# Patient Record
Sex: MALE | Race: WHITE
[De-identification: names, ages, dates, MRNs, and addresses within clinical notes are randomized per-mention and may not be internally consistent; named-entity substitution may affect disease eponyms.]

---

## 2019-06-25 ENCOUNTER — HOSPITAL ENCOUNTER (OUTPATIENT)
Dept: HOSPITAL 84 - D.HCCARDIO | Age: 59
Discharge: HOME | End: 2019-06-25
Attending: INTERNAL MEDICINE
Payer: COMMERCIAL

## 2019-06-25 VITALS — BODY MASS INDEX: 21.9 KG/M2

## 2019-06-25 DIAGNOSIS — I35.9: Primary | ICD-10-CM

## 2019-06-26 NOTE — EC
PATIENT:DARRIN KENNEDY             DATE OF SERVICE: 06/25/19
SEX: M                                  MEDICAL RECORD: [id number]
DATE OF BIRTH: [birth year]                        [facility]          
AGE OF PATIENT: 58                             ADMISSION DATE: 06/25/19
 
REFERRING PHYSICIAN:                               
 
INTERPRETING PHYSICIAN: DALJIT OROSCO MD             
 
 
 
                             ECHOCARDIOGRAM REPORT
  ECHO CHARGES 4               ECHO COMPLETE                 Date: 06/25/19
 
 
 
CLINICAL DIAGNOSIS: EDEMA/AVR                     
                    H/O HTN                       
                         ECHOCARDIOGRAPHIC MEASUREMENTS
      (adult normal given)
   AC root (d.<3.7cm) 3.3  cm   LV Septum d (<1.2 cm> 1.4  cm
      Valve Excursion 1.4  cm     LV Septum (systole) 2.0  cm
Left Atria (s.<4.0cm> 4.1  cm          LVPW d(<1.2cm) 1.5  cm
        RV (d.<2.3cm) 2.9  cm           LVPW (sytole) 2.0  cm
  LV diastole(<5.6CM) 6.0  cm       MV E-F(>70mm/sec)      cm
           LV systole 4.0  cm           LVOT Diameter 2.4  cm
       MV exc.(>10mm)      cm
Est.ejection fraction (50-75%)     %
 
   DOPPLER:
     LVIT      cm/sec A 45.0 cm/sec E 132   cm/sec
       LA      cm/sec      RVSP 45.0 mmHg
     LVOT 77.0 cm/sec   AOP1/2T      m/s
  Asc. Ao 357  cm/sec
     RVOT 52.0 cm/sec
       RA      cm/sec
         cm/sec
 AV Gradient Peak 51.0 mmHg  AV Mean 27.0 mmHg  AV Area 1.0  cm
 MV Gradient Peak 7.9  mmHg  MV Mean 2.4  mmHg  MV Area      cm
   COMMENTS: OP - HC                                      
 
 
 Cardiac Sonographer: Hussein WEIR JESSICA            
      Cardiologist: 1          Dr. Orosco                
             TAPE# PACS           
                                       Pericardial Effusion N                        
 
 
DATE OF SERVICE:  06/25/2019
 
FINDINGS:
1. Left ventricular chamber size is mildly dilated.  Left ventricular systolic
function is mild to moderately reduced at 35%.
2. Left atrium, right atrium, and right ventricular chamber sizes are dilated. 
Left atrium measures 4.1 cm.
3. Valvular structures:  Aortic valve was replaced with mechanical prosthesis
with normal structure and function in this position.  The remaining valvular
structures have normal structure and motion.
 
 
 
ECHOCARDIOGRAM REPORT                          [id number]    DARRIN KENNEDY     
 
 
4. Doppler interrogation reveals mild mitral regurgitation, moderate tricuspid
regurgitation, no other valvular insufficiency or stenosis.  Pulmonary systolic
pressure is estimated at 45 mmHg.
5. No evidence of pericardial effusion or left ventricular thrombus.
 
TRANSINT:[id number] Voice Confirmation ID: [id number] DOCUMENT ID: [id number]
                                           
                                           DALJIT OROSCO MD             
 
 
 
Electronically Signed by DALJIT OROSCO on 06/26/19 at 1000
 
 
 
 
 
 
 
 
 
 
 
 
 
 
 
 
 
 
 
 
 
 
 
 
 
 
 
 
 
 
 
 
 
 
CC:                                                             [id number]
DICTATION DATE: 06/25/19 1554     :     06/26/19 0004      [facility] CLI 
                                                                      06/25/19
[address] [address]

## 2019-07-05 ENCOUNTER — HOSPITAL ENCOUNTER (EMERGENCY)
Dept: HOSPITAL 84 - D.ER | Age: 59
Discharge: HOME | End: 2019-07-05
Payer: COMMERCIAL

## 2019-07-05 VITALS — BODY MASS INDEX: 20.7 KG/M2

## 2019-07-05 VITALS — SYSTOLIC BLOOD PRESSURE: 127 MMHG | DIASTOLIC BLOOD PRESSURE: 71 MMHG

## 2019-07-05 DIAGNOSIS — R53.1: ICD-10-CM

## 2019-07-05 DIAGNOSIS — I10: ICD-10-CM

## 2019-07-05 DIAGNOSIS — D64.9: Primary | ICD-10-CM

## 2019-07-05 DIAGNOSIS — R11.2: ICD-10-CM

## 2019-07-05 LAB
ALBUMIN SERPL-MCNC: 3.8 G/DL (ref 3.4–5)
ALP SERPL-CCNC: 78 U/L (ref 46–116)
ALT SERPL-CCNC: 42 U/L (ref 10–68)
ANION GAP SERPL CALC-SCNC: 12.1 MMOL/L (ref 8–16)
APPEARANCE UR: CLEAR
APTT BLD: 40.2 SECONDS (ref 22.8–39.4)
BASOPHILS NFR BLD AUTO: 0.2 % (ref 0–2)
BILIRUB SERPL-MCNC: 0.32 MG/DL (ref 0.2–1.3)
BILIRUB SERPL-MCNC: NEGATIVE MG/DL
BUN SERPL-MCNC: 22 MG/DL (ref 7–18)
CALCIUM SERPL-MCNC: 9.2 MG/DL (ref 8.5–10.1)
CHLORIDE SERPL-SCNC: 101 MMOL/L (ref 98–107)
CK MB SERPL-MCNC: 0.6 U/L (ref 0–3.6)
CK SERPL-CCNC: 50 UL (ref 21–232)
CO2 SERPL-SCNC: 26.9 MMOL/L (ref 21–32)
COLOR UR: YELLOW
CREAT SERPL-MCNC: 1.1 MG/DL (ref 0.6–1.3)
EOSINOPHIL NFR BLD: 2.3 % (ref 0–7)
ERYTHROCYTE [DISTWIDTH] IN BLOOD BY AUTOMATED COUNT: 15 % (ref 11.5–14.5)
GLOBULIN SER-MCNC: 3.9 G/L
GLUCOSE SERPL-MCNC: 134 MG/DL (ref 74–106)
GLUCOSE SERPL-MCNC: NEGATIVE MG/DL
HCT VFR BLD CALC: 36.4 % (ref 42–54)
HGB BLD-MCNC: 11.9 G/DL (ref 13.5–17.5)
IMM GRANULOCYTES NFR BLD: 0.2 % (ref 0–5)
INR PPP: 2.52 (ref 0.85–1.17)
KETONES UR STRIP-MCNC: NEGATIVE MG/DL
LYMPHOCYTES NFR BLD AUTO: 21.1 % (ref 15–50)
MAGNESIUM SERPL-MCNC: 1.9 MG/DL (ref 1.8–2.4)
MCH RBC QN AUTO: 29.2 PG (ref 26–34)
MCHC RBC AUTO-ENTMCNC: 32.7 G/DL (ref 31–37)
MCV RBC: 89.4 FL (ref 80–100)
MONOCYTES NFR BLD: 6.5 % (ref 2–11)
NEUTROPHILS NFR BLD AUTO: 69.7 % (ref 40–80)
NITRITE UR-MCNC: NEGATIVE MG/ML
OSMOLALITY SERPL CALC.SUM OF ELEC: 276 MOSM/KG (ref 275–300)
PH UR STRIP: 5 [PH] (ref 5–6)
PLATELET # BLD: 252 10X3/UL (ref 130–400)
PMV BLD AUTO: 10.2 FL (ref 7.4–10.4)
POTASSIUM SERPL-SCNC: 4 MMOL/L (ref 3.5–5.1)
PROT SERPL-MCNC: 7.7 G/DL (ref 6.4–8.2)
PROT UR-MCNC: (no result) MG/DL
PROTHROMBIN TIME: 26.5 SECONDS (ref 11.6–15)
RBC # BLD AUTO: 4.07 10X6/UL (ref 4.2–6.1)
SODIUM SERPL-SCNC: 136 MMOL/L (ref 136–145)
SP GR UR STRIP: 1.01 (ref 1–1.02)
TROPONIN I SERPL-MCNC: < 0.017 NG/ML (ref 0–0.06)
UROBILINOGEN UR-MCNC: NORMAL MG/DL
WBC # BLD AUTO: 9 10X3/UL (ref 4.8–10.8)

## 2019-07-08 ENCOUNTER — HOSPITAL ENCOUNTER (INPATIENT)
Dept: HOSPITAL 84 - D.M2 | Age: 59
LOS: 3 days | Discharge: HOME | DRG: 812 | End: 2019-07-11
Attending: FAMILY MEDICINE | Admitting: FAMILY MEDICINE
Payer: COMMERCIAL

## 2019-07-08 VITALS
HEIGHT: 76 IN | WEIGHT: 172.36 LBS | BODY MASS INDEX: 20.99 KG/M2 | WEIGHT: 172.36 LBS | BODY MASS INDEX: 20.99 KG/M2 | HEIGHT: 76 IN

## 2019-07-08 VITALS — SYSTOLIC BLOOD PRESSURE: 115 MMHG | DIASTOLIC BLOOD PRESSURE: 63 MMHG

## 2019-07-08 DIAGNOSIS — K21.0: ICD-10-CM

## 2019-07-08 DIAGNOSIS — J44.9: ICD-10-CM

## 2019-07-08 DIAGNOSIS — F17.213: ICD-10-CM

## 2019-07-08 DIAGNOSIS — K25.9: ICD-10-CM

## 2019-07-08 DIAGNOSIS — I11.0: ICD-10-CM

## 2019-07-08 DIAGNOSIS — I25.119: ICD-10-CM

## 2019-07-08 DIAGNOSIS — K29.70: ICD-10-CM

## 2019-07-08 DIAGNOSIS — D50.0: Primary | ICD-10-CM

## 2019-07-08 DIAGNOSIS — I50.20: ICD-10-CM

## 2019-07-08 LAB
ALBUMIN SERPL-MCNC: 3.5 G/DL (ref 3.4–5)
ALP SERPL-CCNC: 66 U/L (ref 46–116)
ALT SERPL-CCNC: 38 U/L (ref 10–68)
ANION GAP SERPL CALC-SCNC: 9.7 MMOL/L (ref 8–16)
BASOPHILS NFR BLD AUTO: 0.4 % (ref 0–2)
BILIRUB SERPL-MCNC: 0.18 MG/DL (ref 0.2–1.3)
BUN SERPL-MCNC: 18 MG/DL (ref 7–18)
CALCIUM SERPL-MCNC: 8.5 MG/DL (ref 8.5–10.1)
CHLORIDE SERPL-SCNC: 104 MMOL/L (ref 98–107)
CK MB SERPL-MCNC: 1.1 U/L (ref 0–3.6)
CK SERPL-CCNC: 53 UL (ref 21–232)
CO2 SERPL-SCNC: 30.5 MMOL/L (ref 21–32)
CREAT SERPL-MCNC: 1.1 MG/DL (ref 0.6–1.3)
EOSINOPHIL NFR BLD: 5.2 % (ref 0–7)
ERYTHROCYTE [DISTWIDTH] IN BLOOD BY AUTOMATED COUNT: 15.6 % (ref 11.5–14.5)
GLOBULIN SER-MCNC: 3.1 G/L
GLUCOSE SERPL-MCNC: 96 MG/DL (ref 74–106)
HCT VFR BLD CALC: 35.1 % (ref 42–54)
HGB BLD-MCNC: 11.2 G/DL (ref 13.5–17.5)
IMM GRANULOCYTES NFR BLD: 0.1 % (ref 0–5)
INR PPP: 2.5 (ref 0.85–1.17)
LYMPHOCYTES NFR BLD AUTO: 29.5 % (ref 15–50)
MAGNESIUM SERPL-MCNC: 1.8 MG/DL (ref 1.8–2.4)
MCH RBC QN AUTO: 29.2 PG (ref 26–34)
MCHC RBC AUTO-ENTMCNC: 31.9 G/DL (ref 31–37)
MCV RBC: 91.6 FL (ref 80–100)
MONOCYTES NFR BLD: 8.4 % (ref 2–11)
NEUTROPHILS NFR BLD AUTO: 56.4 % (ref 40–80)
OSMOLALITY SERPL CALC.SUM OF ELEC: 280 MOSM/KG (ref 275–300)
PHOSPHATE SERPL-MCNC: 3.8 MG/DL (ref 2.5–4.9)
PLATELET # BLD: 239 10X3/UL (ref 130–400)
PMV BLD AUTO: 10.1 FL (ref 7.4–10.4)
POTASSIUM SERPL-SCNC: 4.2 MMOL/L (ref 3.5–5.1)
PROT SERPL-MCNC: 6.6 G/DL (ref 6.4–8.2)
PROTHROMBIN TIME: 26.3 SECONDS (ref 11.6–15)
RBC # BLD AUTO: 3.83 10X6/UL (ref 4.2–6.1)
SODIUM SERPL-SCNC: 140 MMOL/L (ref 136–145)
TROPONIN I SERPL-MCNC: < 0.017 NG/ML (ref 0–0.06)
WBC # BLD AUTO: 7 10X3/UL (ref 4.8–10.8)

## 2019-07-09 VITALS — DIASTOLIC BLOOD PRESSURE: 73 MMHG | SYSTOLIC BLOOD PRESSURE: 129 MMHG

## 2019-07-09 VITALS — DIASTOLIC BLOOD PRESSURE: 65 MMHG | SYSTOLIC BLOOD PRESSURE: 149 MMHG

## 2019-07-09 VITALS — DIASTOLIC BLOOD PRESSURE: 58 MMHG | SYSTOLIC BLOOD PRESSURE: 119 MMHG

## 2019-07-09 VITALS — DIASTOLIC BLOOD PRESSURE: 63 MMHG | SYSTOLIC BLOOD PRESSURE: 115 MMHG

## 2019-07-09 VITALS — DIASTOLIC BLOOD PRESSURE: 70 MMHG | SYSTOLIC BLOOD PRESSURE: 136 MMHG

## 2019-07-09 VITALS — DIASTOLIC BLOOD PRESSURE: 55 MMHG | SYSTOLIC BLOOD PRESSURE: 157 MMHG

## 2019-07-09 LAB
ALBUMIN SERPL-MCNC: 3.3 G/DL (ref 3.4–5)
ALP SERPL-CCNC: 64 U/L (ref 46–116)
ALT SERPL-CCNC: 32 U/L (ref 10–68)
ANION GAP SERPL CALC-SCNC: 12.1 MMOL/L (ref 8–16)
APTT BLD: 35 SECONDS (ref 22.8–39.4)
BASOPHILS NFR BLD AUTO: 0.2 % (ref 0–2)
BILIRUB SERPL-MCNC: 0.28 MG/DL (ref 0.2–1.3)
BUN SERPL-MCNC: 19 MG/DL (ref 7–18)
CALCIUM SERPL-MCNC: 7.9 MG/DL (ref 8.5–10.1)
CHLORIDE SERPL-SCNC: 101 MMOL/L (ref 98–107)
CK MB SERPL-MCNC: 0.9 U/L (ref 0–3.6)
CK MB SERPL-MCNC: 1.1 U/L (ref 0–3.6)
CK MB SERPL-MCNC: 1.2 U/L (ref 0–3.6)
CK SERPL-CCNC: 50 UL (ref 21–232)
CK SERPL-CCNC: 53 UL (ref 21–232)
CK SERPL-CCNC: 57 UL (ref 21–232)
CO2 SERPL-SCNC: 27.3 MMOL/L (ref 21–32)
CREAT SERPL-MCNC: 1.2 MG/DL (ref 0.6–1.3)
EOSINOPHIL NFR BLD: 3.3 % (ref 0–7)
ERYTHROCYTE [DISTWIDTH] IN BLOOD BY AUTOMATED COUNT: 15.8 % (ref 11.5–14.5)
FERRITIN SERPL-MCNC: 134 NG/ML (ref 3–244)
GLOBULIN SER-MCNC: 3.1 G/L
GLUCOSE SERPL-MCNC: 159 MG/DL (ref 74–106)
HCT VFR BLD CALC: 34.3 % (ref 42–54)
HGB BLD-MCNC: 10.7 G/DL (ref 13.5–17.5)
IMM GRANULOCYTES NFR BLD: 0.2 % (ref 0–5)
INR PPP: 2.47 (ref 0.85–1.17)
IRON SERPL-MCNC: 77 UG/DL (ref 35–150)
LDH1 SERPL-CCNC: 362 U/L (ref 85–227)
LYMPHOCYTES NFR BLD AUTO: 14.5 % (ref 15–50)
MCH RBC QN AUTO: 28.6 PG (ref 26–34)
MCHC RBC AUTO-ENTMCNC: 31.2 G/DL (ref 31–37)
MCV RBC: 91.7 FL (ref 80–100)
MONOCYTES NFR BLD: 6.6 % (ref 2–11)
NEUTROPHILS NFR BLD AUTO: 75.2 % (ref 40–80)
NT-PROBNP SERPL-MCNC: 422 PG/ML (ref 0–125)
OSMOLALITY SERPL CALC.SUM OF ELEC: 276 MOSM/KG (ref 275–300)
PLATELET # BLD: 203 10X3/UL (ref 130–400)
PMV BLD AUTO: 10.1 FL (ref 7.4–10.4)
POTASSIUM SERPL-SCNC: 4.4 MMOL/L (ref 3.5–5.1)
PROT SERPL-MCNC: 6.4 G/DL (ref 6.4–8.2)
PROTHROMBIN TIME: 26 SECONDS (ref 11.6–15)
RBC # BLD AUTO: 3.74 10X6/UL (ref 4.2–6.1)
SAO2 % BLD FROM PO2: 22 % (ref 15–55)
SODIUM SERPL-SCNC: 136 MMOL/L (ref 136–145)
TIBC SERPL-MCNC: 345 UG/DL (ref 260–445)
TROPONIN I SERPL-MCNC: < 0.017 NG/ML (ref 0–0.06)
UIBC SERPL-MCNC: 268 UG/DL (ref 150–375)
WBC # BLD AUTO: 10.2 10X3/UL (ref 4.8–10.8)

## 2019-07-09 NOTE — NUR
ROUNDS COMPLETED. VSS, AAOX3, NO S/S OF RR DISTRESS. RR EVEN AND UNLABORED. PT
COMPLAIN OF BACK AND ABD PAIN. WILL ADMINISTER PAIN MED. PT DENIES ANY FURTHER
NEEDS AT THIS TIME. WILL CTM.

## 2019-07-10 VITALS — SYSTOLIC BLOOD PRESSURE: 132 MMHG | DIASTOLIC BLOOD PRESSURE: 70 MMHG

## 2019-07-10 VITALS — SYSTOLIC BLOOD PRESSURE: 117 MMHG | DIASTOLIC BLOOD PRESSURE: 58 MMHG

## 2019-07-10 VITALS — DIASTOLIC BLOOD PRESSURE: 68 MMHG | SYSTOLIC BLOOD PRESSURE: 116 MMHG

## 2019-07-10 VITALS — DIASTOLIC BLOOD PRESSURE: 57 MMHG | SYSTOLIC BLOOD PRESSURE: 119 MMHG

## 2019-07-10 VITALS — DIASTOLIC BLOOD PRESSURE: 47 MMHG | SYSTOLIC BLOOD PRESSURE: 103 MMHG

## 2019-07-10 VITALS — SYSTOLIC BLOOD PRESSURE: 124 MMHG | DIASTOLIC BLOOD PRESSURE: 70 MMHG

## 2019-07-10 LAB
ANION GAP SERPL CALC-SCNC: 8.2 MMOL/L (ref 8–16)
BASOPHILS NFR BLD AUTO: 0.2 % (ref 0–2)
BUN SERPL-MCNC: 19 MG/DL (ref 7–18)
CALCIUM SERPL-MCNC: 8.4 MG/DL (ref 8.5–10.1)
CHLORIDE SERPL-SCNC: 103 MMOL/L (ref 98–107)
CO2 SERPL-SCNC: 30.3 MMOL/L (ref 21–32)
CREAT SERPL-MCNC: 0.9 MG/DL (ref 0.6–1.3)
EOSINOPHIL NFR BLD: 3.1 % (ref 0–7)
ERYTHROCYTE [DISTWIDTH] IN BLOOD BY AUTOMATED COUNT: 16 % (ref 11.5–14.5)
GLUCOSE SERPL-MCNC: 98 MG/DL (ref 74–106)
HCT VFR BLD CALC: 37 % (ref 42–54)
HGB BLD-MCNC: 11.6 G/DL (ref 13.5–17.5)
IMM GRANULOCYTES NFR BLD: 0.2 % (ref 0–5)
LYMPHOCYTES NFR BLD AUTO: 17.6 % (ref 15–50)
MAGNESIUM SERPL-MCNC: 1.9 MG/DL (ref 1.8–2.4)
MCH RBC QN AUTO: 28.9 PG (ref 26–34)
MCHC RBC AUTO-ENTMCNC: 31.4 G/DL (ref 31–37)
MCV RBC: 92.3 FL (ref 80–100)
MONOCYTES NFR BLD: 8.2 % (ref 2–11)
NEUTROPHILS NFR BLD AUTO: 70.7 % (ref 40–80)
OSMOLALITY SERPL CALC.SUM OF ELEC: 275 MOSM/KG (ref 275–300)
PHOSPHATE SERPL-MCNC: 2.9 MG/DL (ref 2.5–4.9)
PLATELET # BLD: 240 10X3/UL (ref 130–400)
PMV BLD AUTO: 10.2 FL (ref 7.4–10.4)
POTASSIUM SERPL-SCNC: 4.5 MMOL/L (ref 3.5–5.1)
RBC # BLD AUTO: 4.01 10X6/UL (ref 4.2–6.1)
SODIUM SERPL-SCNC: 137 MMOL/L (ref 136–145)
WBC # BLD AUTO: 10.9 10X3/UL (ref 4.8–10.8)

## 2019-07-10 PROCEDURE — 0DJ08ZZ INSPECTION OF UPPER INTESTINAL TRACT, VIA NATURAL OR ARTIFICIAL OPENING ENDOSCOPIC: ICD-10-PCS | Performed by: INTERNAL MEDICINE

## 2019-07-10 NOTE — NUR
ROUNDS COMPLETED. VSS, AAOX3, NO S/S OF RESP DISTRESS. PT AWAITING DC.
OUTGOING NURSE STATES SHE TRIED TO REACH GI DOCTOR TO GET RELEASE INFORMATION
ABOUT PT ANTICOAGULANT THERAPY AT HOME. GI DOCTOR PAGED, NOT RESPONSE AT THIS
TIME. SNACK PROVIDED PER PT REQUEST. PT VOICED THANKS. SPOUSE @BEDSIDE. IV NOT
INFUSING AT THIS TIME. WILL CPOC.

## 2019-07-10 NOTE — NUR
RETURN TO ROOM. ALERT DENIES ANY CURRENT NEEDS. VSS.98.0,116/68,97%,73, AND
18. RESP EVEN WITHOUT LABOR. NO N/V.

## 2019-07-10 NOTE — NUR
REPORT RECEIVED. ALERT ABLE TO VOICE NEEDS RESP EVEN WITHOUT LABOR NO C/O.LEFT
HAND WITH IV INFUSING NS AT 100CC/HR AND PROTONIX DRIP AT 10

## 2019-07-10 NOTE — NUR
LEFT AT THIS TIME TO GO FOR EGD. ALERT AND WITH NO C/O. RESP EVEN WITHOUT
LABOR. MONITOR TECH REPORTS RUN OF 4 VTACH ON MONITOR BUT HE HAS NO SYMPTOMS.

## 2019-07-11 VITALS — DIASTOLIC BLOOD PRESSURE: 72 MMHG | SYSTOLIC BLOOD PRESSURE: 123 MMHG

## 2019-07-11 VITALS — DIASTOLIC BLOOD PRESSURE: 52 MMHG | SYSTOLIC BLOOD PRESSURE: 100 MMHG

## 2019-07-11 LAB
ANION GAP SERPL CALC-SCNC: 5.8 MMOL/L (ref 8–16)
BASOPHILS NFR BLD AUTO: 0.2 % (ref 0–2)
BUN SERPL-MCNC: 21 MG/DL (ref 7–18)
CALCIUM SERPL-MCNC: 8.1 MG/DL (ref 8.5–10.1)
CHLORIDE SERPL-SCNC: 101 MMOL/L (ref 98–107)
CO2 SERPL-SCNC: 33.3 MMOL/L (ref 21–32)
CREAT SERPL-MCNC: 1.1 MG/DL (ref 0.6–1.3)
EOSINOPHIL NFR BLD: 5.3 % (ref 0–7)
ERYTHROCYTE [DISTWIDTH] IN BLOOD BY AUTOMATED COUNT: 16 % (ref 11.5–14.5)
GLUCOSE SERPL-MCNC: 93 MG/DL (ref 74–106)
HCT VFR BLD CALC: 33.5 % (ref 42–54)
HGB BLD-MCNC: 10.7 G/DL (ref 13.5–17.5)
IMM GRANULOCYTES NFR BLD: 0.2 % (ref 0–5)
LYMPHOCYTES NFR BLD AUTO: 30.9 % (ref 15–50)
MAGNESIUM SERPL-MCNC: 1.9 MG/DL (ref 1.8–2.4)
MCH RBC QN AUTO: 29.3 PG (ref 26–34)
MCHC RBC AUTO-ENTMCNC: 31.9 G/DL (ref 31–37)
MCV RBC: 91.8 FL (ref 80–100)
MONOCYTES NFR BLD: 13.7 % (ref 2–11)
NEUTROPHILS NFR BLD AUTO: 49.7 % (ref 40–80)
OSMOLALITY SERPL CALC.SUM OF ELEC: 274 MOSM/KG (ref 275–300)
PHOSPHATE SERPL-MCNC: 4.1 MG/DL (ref 2.5–4.9)
PLATELET # BLD: 218 10X3/UL (ref 130–400)
PMV BLD AUTO: 10.5 FL (ref 7.4–10.4)
POTASSIUM SERPL-SCNC: 4.1 MMOL/L (ref 3.5–5.1)
RBC # BLD AUTO: 3.65 10X6/UL (ref 4.2–6.1)
SODIUM SERPL-SCNC: 136 MMOL/L (ref 136–145)
WBC # BLD AUTO: 6.6 10X3/UL (ref 4.8–10.8)

## 2019-07-11 NOTE — NUR
SPOKE WITH DR AMBROCIO CONCERNING STARTING OF PLAVIX. HE STATED WAIT 5 DAYS THEN
RESTART BUT WHEN I SPOKE WITH PATIENT ABOUT RESTARTING HIS COUMADIN TODAY HE
TOLD ME THEY HAD STOPPED THE PLAVIX AND THAT HE NO LONGER TAKES IT.

## 2019-07-11 NOTE — NUR
SPOKE WITH DR AMBROCIO CONCERNING WHEN TO RESTART COUMADIN AND HE STATED IT COULD
BE RESTARTED TODAY. PAGED DARON TO NOTIFY HER.

## 2019-07-11 NOTE — MORECARE
CASE MANAGEMENT DISCHARGE SUMMARY
 
 
PATIENT: DARRIN KENNEDY                 UNIT: [id number]
ACCOUNT#: [id number]                       ADM DATE: 19
AGE: 58     : 60  SEX: M            ROOM/BED: D.3599    
AUTHOR: MARGARITA,DOC                             PHYSICIAN:                               
 
REFERRING PHYSICIAN: DELGADO CISNEROS MD                
DATE OF SERVICE: 19
Discharge Plan
 
 
Patient Name: DARRIN KENNEDY
Facility: [facility]:[place]
Encounter #: [id number]
Medical Record #: [id number]
: 1960
Planned Disposition: Home
Anticipated Discharge Date: 19
 
Discharge Date: 2019
Expected LOS: 3
Initial Reviewer: [id number]
Initial Review Date: 2019
Generated: 19   2:35 pm 
Comments
 
DCP- Discharge Planning
 
Updated by [email or web address]: Raymon Bernal on 19  12:32 pm CT
Patient Name: DARRIN KENNEDY                                     
Admission Status: Elective   
Accout number: [id number]                              
Admission Date: 2019   
: 1960                                                        
Admission Diagnosis:ANEMIA, UNSPECIFIED   
Attending: DELGADO CISNEROS                                                
Current LOS:  3   
  
Anticipated DC Date: 2019   
Planned Disposition: Home   
Primary Insurance: [employer] MA[employer]   
  
  
Discharge Planning Comments:   
CM MET WITH PT IN ROOM TO DISCUSS DISCHARGE PLANNING AND NEEDS. PT REPORTS 
LIVING AT HOME INDEPENDENTLY WITH SPOUSE.  PT HAS NO MEDICAL EQUIPMENT AND NO 
OUTSIDE SERVICES ASSISTING IN THE HOME. PT PREFERS [facility] IF 
HE EVER NEEDED EQUIOPMENT.  CM DISCUSSED AVAILABILITY OF HOME HEALTH, REHAB 
 
SERVICES AND MEDICAL EQUIPMENT. PT DENIES DISCHARGE NEEDS, REPORTS HIS WIFE 
WILL PICK HIM UP FOR DISCHARGE HOME.  
  
  
: Raymon Bernal
 DCPIA - Discharge Planning Initial Assessment
 
Updated by [id number]: Raymon Bernal on 19   1:24 pm
*  Is the patient Alert and Oriented?
Yes
*  How many steps to enter\exit or inside your home?
 
*  PCP
DR. MOHAN
*  Pharmacy
[facility]
*  Preadmission Environment
Home with Family
*  ADLs
Independent
*  Equipment
None
*  Other Equipment
[facility], MEDICAL EQUIPMENT PROVIDER PREFERENCE
*  List name and contact numbers for known caregivers / representatives who 
currently or will assist patient after discharge:
DREW KENNEDY, SPOUSE, [phone]
*  Verbal permission to speak to the caregivers and representatives has been 
obtained from the patient.
Yes
*  Community resources currently utilized
None
*  Please name any agencies selected above.
NONE
*  Additional services required to return to the preadmission environment?
No
*  Can the patient safely return to the preadmission environment?
Yes
*  Has this patient been hospitalized within the prior 30 days at any 
hospital?
No
 
 
 
 
 
 
 
Last DP export: 19  12:28 p
Patient Name: DARRIN KENNEDY
 
Encounter #: [id number]
Page [id number]
 
 
 
 
 
Electronically Signed by GISSELL AVILA on 19 at 1335
 
 
 
 
 
 
**All edits/amendments must be made on the electronic document**
 
DICTATION DATE: 19 1335     : KATRINA  19 1335     
RPT#: [id number]                                DC DATE:19
                                               STATUS: DIS IN  
[facility]
[address] [place] [address]
***END OF REPORT***

## 2019-07-11 NOTE — MORECARE
CASE MANAGEMENT DISCHARGE SUMMARY
 
 
PATIENT: DARRIN KENNEDY                 UNIT: [id number]
ACCOUNT#: [id number]                       ADM DATE: 19
AGE: 58     : 60  SEX: M            ROOM/BED: D.2135    
AUTHOR: GISSELL AVILA                             PHYSICIAN:                               
 
REFERRING PHYSICIAN: DELGADO CISNEROS MD                
DATE OF SERVICE: 19
Discharge Plan
 
 
Patient Name: DARRIN KENNEDY
[facility] [place]
Encounter #: [id number]
Medical Record #: [id number]
: 1960
Planned Disposition: Home
Anticipated Discharge Date: 19
 
Discharge Date: 2019
Expected LOS: 3
Initial Reviewer: [email or web address]
Initial Review Date: 2019
Generated: 19   2:28 pm 
 DCPIA - Discharge Planning Initial Assessment
 
Updated by [id number]: Raymon Bernal on 19   1:24 pm
*  Is the patient Alert and Oriented?
Yes
*  How many steps to enter\exit or inside your home?
 
*  PCP
DR. MOHAN
*  Pharmacy
[facility]
*  Preadmission Environment
Home with Family
*  ADLs
Independent
*  Equipment
None
*  Other Equipment
[facility] MEDICAL EQUIPMENT PROVIDER [facility]REFER[facility]
*  List name and contact numbers for known caregivers / representatives who 
currently or will assist patient after discharge:
DREW KENNEDY, SPOUSE, [phone]
*  Verbal permission to speak to the caregivers and representatives has been 
obtained from the patient.
 
Yes
*  Community resources currently utilized
None
*  Please name any agencies selected above.
NONE
*  Additional services required to return to the preadmission environment?
No
*  Can the patient safely return to the preadmission environment?
Yes
*  Has this patient been hospitalized within the prior 30 days at any 
hospital?
No
 
 
 
 
 
 
Patient Name: DARRIN KENNEDY
Encounter #: [id number]
Page [id number]
 
 
 
 
 
Electronically Signed by GISSELL AVILA on 19 at 1328
 
 
 
 
 
 
**All edits/amendments must be made on the electronic document**
 
DICTATION DATE: 19 1328     : KATRINA  19 1328     
RPT#: [id number]                                DC DATE:19
                                               STATUS: DIS IN  
[facility]
[address] [place] [address]
***END OF REPORT***

## 2019-07-11 NOTE — NUR
REPORT RECEIVED. ALERT SITTING IN BED WATCHING TV. SALINE LOCK INTACT. RESP
EVEN WITHOUT LABOR. CL IN REACH. REPORTS NO ABDOMINAL PAIN WITH SOFT ABDOMEN
AND POSITIVE BOWEL SOUNDS

## 2019-07-11 NOTE — NUR
DISCHARGE INSTRUCTIONS EXPLAINED TO HIM IN DETAIL. COPY OF DISCHARGE PAPERS
WITH TEACHING INCLUDED. D/C SALINE LOCK WITH CATHETER INTACT, MINIMAL BLEEDING
FROM SITE. DENIES ANY ABDOMINAL PAIN OR ISSUES. VITALS ARE STABLE. WIFE IF
HERE. HE WAS TOOK DOWN BY W/C.

## 2019-07-15 ENCOUNTER — HOSPITAL ENCOUNTER (INPATIENT)
Dept: HOSPITAL 84 - D.ER | Age: 59
LOS: 3 days | Discharge: HOME | DRG: 246 | End: 2019-07-18
Attending: INTERNAL MEDICINE | Admitting: INTERNAL MEDICINE
Payer: COMMERCIAL

## 2019-07-15 VITALS
HEIGHT: 76 IN | BODY MASS INDEX: 21.11 KG/M2 | WEIGHT: 173.36 LBS | HEIGHT: 76 IN | WEIGHT: 173.36 LBS | BODY MASS INDEX: 21.11 KG/M2 | BODY MASS INDEX: 21.11 KG/M2

## 2019-07-15 VITALS — DIASTOLIC BLOOD PRESSURE: 69 MMHG | SYSTOLIC BLOOD PRESSURE: 125 MMHG

## 2019-07-15 VITALS — SYSTOLIC BLOOD PRESSURE: 125 MMHG | DIASTOLIC BLOOD PRESSURE: 69 MMHG

## 2019-07-15 VITALS — DIASTOLIC BLOOD PRESSURE: 63 MMHG | SYSTOLIC BLOOD PRESSURE: 132 MMHG

## 2019-07-15 VITALS — SYSTOLIC BLOOD PRESSURE: 138 MMHG | DIASTOLIC BLOOD PRESSURE: 72 MMHG

## 2019-07-15 DIAGNOSIS — N17.9: ICD-10-CM

## 2019-07-15 DIAGNOSIS — R29.810: ICD-10-CM

## 2019-07-15 DIAGNOSIS — I11.0: ICD-10-CM

## 2019-07-15 DIAGNOSIS — I21.4: Primary | ICD-10-CM

## 2019-07-15 DIAGNOSIS — Z79.01: ICD-10-CM

## 2019-07-15 DIAGNOSIS — K21.9: ICD-10-CM

## 2019-07-15 DIAGNOSIS — Z86.73: ICD-10-CM

## 2019-07-15 DIAGNOSIS — I50.20: ICD-10-CM

## 2019-07-15 DIAGNOSIS — R47.01: ICD-10-CM

## 2019-07-15 DIAGNOSIS — D64.9: ICD-10-CM

## 2019-07-15 DIAGNOSIS — I63.10: ICD-10-CM

## 2019-07-15 DIAGNOSIS — F17.213: ICD-10-CM

## 2019-07-15 DIAGNOSIS — E78.5: ICD-10-CM

## 2019-07-15 LAB
ALBUMIN SERPL-MCNC: 3.7 G/DL (ref 3.4–5)
ALP SERPL-CCNC: 69 U/L (ref 46–116)
ALT SERPL-CCNC: 31 U/L (ref 10–68)
ANION GAP SERPL CALC-SCNC: 11 MMOL/L (ref 8–16)
APTT BLD: 26.7 SECONDS (ref 22.8–39.4)
BASOPHILS NFR BLD AUTO: 0.3 % (ref 0–2)
BILIRUB SERPL-MCNC: 0.23 MG/DL (ref 0.2–1.3)
BUN SERPL-MCNC: 17 MG/DL (ref 7–18)
CALCIUM SERPL-MCNC: 8.7 MG/DL (ref 8.5–10.1)
CHLORIDE SERPL-SCNC: 99 MMOL/L (ref 98–107)
CK MB SERPL-MCNC: 0.8 U/L (ref 0–3.6)
CK MB SERPL-MCNC: 1 U/L (ref 0–3.6)
CK MB SERPL-MCNC: 1.1 U/L (ref 0–3.6)
CK SERPL-CCNC: 50 UL (ref 21–232)
CK SERPL-CCNC: 56 UL (ref 21–232)
CK SERPL-CCNC: 75 UL (ref 21–232)
CO2 SERPL-SCNC: 31.4 MMOL/L (ref 21–32)
CREAT SERPL-MCNC: 1.1 MG/DL (ref 0.6–1.3)
EOSINOPHIL NFR BLD: 2.7 % (ref 0–7)
ERYTHROCYTE [DISTWIDTH] IN BLOOD BY AUTOMATED COUNT: 16.9 % (ref 11.5–14.5)
GLOBULIN SER-MCNC: 4 G/L
GLUCOSE SERPL-MCNC: 99 MG/DL (ref 74–106)
HCT VFR BLD CALC: 36.8 % (ref 42–54)
HGB BLD-MCNC: 12.1 G/DL (ref 13.5–17.5)
IMM GRANULOCYTES NFR BLD: 0.2 % (ref 0–5)
INR PPP: 1.51 (ref 0.85–1.17)
LYMPHOCYTES NFR BLD AUTO: 21.1 % (ref 15–50)
MAGNESIUM SERPL-MCNC: 2.1 MG/DL (ref 1.8–2.4)
MCH RBC QN AUTO: 29.7 PG (ref 26–34)
MCHC RBC AUTO-ENTMCNC: 32.9 G/DL (ref 31–37)
MCV RBC: 90.4 FL (ref 80–100)
MONOCYTES NFR BLD: 6.9 % (ref 2–11)
NEUTROPHILS NFR BLD AUTO: 68.8 % (ref 40–80)
OSMOLALITY SERPL CALC.SUM OF ELEC: 275 MOSM/KG (ref 275–300)
PLATELET # BLD: 218 10X3/UL (ref 130–400)
PMV BLD AUTO: 10.8 FL (ref 7.4–10.4)
POTASSIUM SERPL-SCNC: 4.4 MMOL/L (ref 3.5–5.1)
PROT SERPL-MCNC: 7.7 G/DL (ref 6.4–8.2)
PROTHROMBIN TIME: 17.6 SECONDS (ref 11.6–15)
RBC # BLD AUTO: 4.07 10X6/UL (ref 4.2–6.1)
SODIUM SERPL-SCNC: 137 MMOL/L (ref 136–145)
TROPONIN I SERPL-MCNC: 0.08 NG/ML (ref 0–0.06)
TROPONIN I SERPL-MCNC: 0.08 NG/ML (ref 0–0.06)
TROPONIN I SERPL-MCNC: 0.1 NG/ML (ref 0–0.06)
TSH SERPL-ACNC: 6.22 UIU/ML (ref 0.36–3.74)
WBC # BLD AUTO: 9.3 10X3/UL (ref 4.8–10.8)

## 2019-07-15 NOTE — MORECARE
CASE MANAGEMENT DISCHARGE SUMMARY
 
 
PATIENT: DARRIN KENNEDY                 UNIT: [id number]
ACCOUNT#: [id number]                       ADM DATE: 07/15/19
AGE: 58     : 60  SEX: M            ROOM/BED: D.2121    
AUTHOR: GISSELL AVILA                             PHYSICIAN:                               
 
REFERRING PHYSICIAN: MIGEL GARCIA MD               
DATE OF SERVICE: 07/15/19
Discharge Plan
 
 
Patient Name: DARRIN KENNEDY
Facility: [facility]:[place]
Encounter #: [id number]
Medical Record #: [id number]
: 1960
Planned Disposition: Home
Anticipated Discharge Date: 19
 
Discharge Date: 
Expected LOS: 2
Initial Reviewer: [email or web address]
Initial Review Date: 07/15/2019
Generated: 7/15/19   7:50 pm 
  
 
 
 
 
 
 
 
Patient Name: DARRIN KENNEDY
 
Encounter #: [id number]
Page [phone]
 
 
 
 
 
Electronically Signed by GISSELL AVILA on 07/15/19 at 1851
 
 
 
 
 
 
**All edits/amendments must be made on the electronic document**
 
DICTATION DATE: 07/15/19 1850     : KATRINA  07/15/19 1850     
RPT#: [id number]                                DC DATE:        
                                               STATUS: ADM IN  
[facility]
[address] [place] [address]
***END OF REPORT***

## 2019-07-15 NOTE — HEMODYNAMI
PATIENT:DARRIN KENNEDY                              MEDICAL RECORD: [id number]
: 60                                            LOCATION:[address]
ACCT# [id number]                                       ADMISSION DATE: 07/15/19
 
 
 Generatedon:201910:54
Patient name: DARRIN KENNEDY Patient #: [id number] Visit #: [id number] SSN: YOB: 1960 Date
of study: 2019
Page: Of
Hemodynamic Procedure Report
****************************
Patient Data
Patient Demographics
Procedure consent was obtained
First Name: DARRIN          Gender: Male
Last Name: BRENT         : 1960
Middle Initial: RAY         Age: 58 year(s)
Patient #: [id number]       Race: Unknown
Visit #: [id number]
Accession #:
[id number]
Additional ID: [id number]
Contact details
Address: [facility] [address]    Phone: [phone]
ROAD
State: [place]
City: [place]
Zip code: [address]
Past Medical History
Allergies: No known allergies
Admission
Admission Data
Admission Date: 7/15/2019   Admission Time: 18:23
Room #: 2121
Lab Results
Lab Result Date: 2019  Lab Result Time: 0:00
Biochemistry
Name         Units    Result                Min      Max
BUN          mg/dl    19       --(----)*-   7        18
Creatinine   mg/dl    1.1      --(--*-)--   0.6      1.3
CBC
Name         Units    Result                Min      Max
Hemoglobin   g/dl     11.2     *-(----)--   13.5     17.5
Procedure
Procedure Types
Cath Procedure
Diagnostic Procedure
LHC
LH w/Coronaries
FFR/IVUS
FFR Initial
PCI Procedure
Coronary Stent
Coronary Stent Initial
Peripheral Cath Diagnostic Procedure
Cath Lab Peripheral Procedures
Four Vessel Arteriogram
Procedure Description
 
Procedure Date
Procedure Date: 2019
Procedure Start Time: 10:35
Procedure End Time: 10:53
Procedure Staff
Name                            Function
Ivan Orosco MD                Performing Physician
Concha Prabhakar RT                    Scrub
Torrey Elizalde RT                  Monitor
Mariya Adame RN                Nurse
Procedure Data
Cath Procedure
Fluoroscopy
Diagnostic fluoroscopy      Total fluoroscopy Time: 4
time: 4 min                 min
Diagnostic fluoroscopy      Total fluoroscopy dose: 589
dose: 589 mGy               mGy
Contrast Material
Contrast Material Type                       Amount (ml)
Isovue 370                                   95
Entry Location
Entry     Primary  Successful  Side  Size  Upsize Upsize Entry    Closure Succes
sful  Closure
Location                             (Fr)  1 (Fr) 2 (Fr) Remarks  Device        
      Remarks
Femoral                        Right 5 Fr  6 Fr                   Exoseal
artery                                     Short
Estimated blood loss: 10 ml
Diagnostic catheters
Device Type               Used For           End Catheter
Placement
MULTIPACK JL 4.0 5Fr      Procedure
catheter
MULTIPACK 3DRC 5Fr        Procedure
catheter
Procedure Complications
No complications
Procedure Medications
Medication           Administration Route Dosage
0.9% NaCl            I.V.                 100 ml/hr
Oxygen               etCO2 Nasal cannula  2 l/min
Lidocaine 2%         added to field       20
Heparin Flush Bag    added to field       2 bags
(1000units/500ml NS)
Versed               I.V.                 2 mg
Fentanyl             I.V.                 50 mcg
Versed               I.V.                 1 mg
Fentanyl             I.V.                 50 mcg
Heparin Bolus        I.V.                 4000 units
Integrilin (Bolus                         6.8 ml
2mg/ml)
Plavix               P.O.                 600 mg
Hemodynamics
Rest
HGB: 11.2 (g/dl) Heart Rate: 72 (bpm)
Snapshots
Pre Cath      Intra         NCS           Post Cath
Vital Signs
Time     Heart  Resp   SPO2 etCO2   NIBP (mmHg) Rhythm  Pain    Sedation
Rate   (ipm)  (%)  (mmHg)                      Status  Level
 
(bpm)
9:54:58  72     20     97   33      148/88(119) NSR     0 (11)  10(A)
, No
pain
9:58:54  68     16     100  34.5    150/92(117) NSR     0 (11)  10(A)
, No
pain
10:02:55 71     16     100  36.8    137/84(113) NSR     0 (11)  10(A)
, No
pain
10:06:55 72     15     100  37.6    130/80(108) NSR     0 (11)  10(A)
, No
pain
10:10:54 75     13     99   38.3    132/78(104) NSR     0 (11)  10(A)
, No
pain
10:14:50 72     13     99   39      135/83(95)  NSR     0 (11)  10(A)
, No
pain
10:18:50 81     12     99   37.6    129/82(101) NSR     0 (11)  10(A)
, No
pain
10:22:51 80     12     99   37.5    122/77(93)  NSR     0 (11)  10(A)
, No
pain
10:26:51 78     10     99   39.1    115/78(91)  NSR     0 (11)  10(A)
, No
pain
10:30:49 81     12     99   39.8    121/79(93)  NSR     0 (11)  10(A)
, No
pain
10:34:48 82     14     99   33.8    116/76(100) NSR     0 (11)  9(A)
, No
pain
10:38:46 81     12     99   19.5    123/80(99)  NSR     0 (11)  9(A)
, No
pain
10:42:43 82     12     99   29.3    128/82(99)  NSR     0 (11)  9(A)
, No
pain
10:46:43 83     13     99   40.6    114/76(104) NSR     0 (11)  10(A)
, No
pain
10:50:45 80     13     98   24.8    106/72(90)  NSR     0 (11)  9(A)
, No
pain
Medications
Time     Medication       Route   Dose  Verified Delivered Reason          Notes
    Effectiveness
by       by
9:55:32  0.9% NaCl        I.V.    100   Ivan  Mariya     used for
ml/hr Ana Luisa Adame   procedure
RN
9:55:38  Oxygen           etCO2   2     Ivan  Mariya     used for
Nasal   l/min Ana Luisa Adame   procedure
cannula                RN
9:55:43  Lidocaine 2%     added   20ml  Ivan  Ivan   for local
to      vial  Ana Luisa Orosco MD  anesthetic
field
9:55:48  Heparin Flush    added   2     Ivan  Ivan   used for
 
Bag              to      bags  Ana Luisa Orosco MD  procedure
(1000units/500ml field
NS)
10:27:27 Versed           I.V.    2 mg  Ivan  Mariya     for sedation
Ana Luisa Adame
RN
10:27:31 Fentanyl         I.V.    50    Ivan  Mariya     for sedation
mcg   Ana Luisa Adame
RN
10:33:11 Versed           I.V.    1 mg  Ivan  Mariya     for sedation
Ana Luisa Adame
RN
10:33:15 Fentanyl         I.V.    50    Ivan  Mariya     for sedation
mcg   Ana Luisa Adame
RN
10:46:04 Heparin Bolus    I.V.    4000  Ivan  Mariya     for             verif
ied
units Ana Luisa Adame   anticoagulation with Dr. LICHA Orosco
10:46:17 Integrilin               6.8   Ivan  Mariya                     waste
d
(Bolus 2mg/ml)           ml    Ana Luisa Adame                   3.2mL
RN
10:46:38 Plavix           P.O.    600   Ivan Meraz     for
mg    Ana Luisa Adame   antiplatelet
RN        therapy
Procedure Log
Time     Note
9:29:15  Diagnostic Cath Status : Elective
9:33:28  Torrey Elizalde RT(R) sent for patient. Start room use.
9:33:29  Time tracking: Regular hours (M-F 7:00 - 5:00)
9:33:33  Plan of Care:Hemodynamics will remain stable., Cardiac
rhythm will remain stable., Comfort level will be
maintained., Respiratory function will remain
adequate., Patient/ family verbilizes understanding of
procedure., Procedure tolerated without complication.,
Recovers from procedure without complications..
9:39:04  Lab Result : Hemoglobin 11.2 g/dl
9:39:04  Lab Result : Creatinine 1.1 mg/dl
9:39:04  Lab Result : BUN 19 mg/dl
9:49:12  Patient received from Med II to CCL 2 Alert and
oriented. Tansferred to table in Supine position.
9:49:13  Warm blankets applied, and kristina hugger turned on for
patient comfort.
9:49:14  Correct patient and procedure confirmed by team.
9:49:15  Signed procedure consent form obtained from patient.
9:49:16  ECG and BP/O2 sat monitors applied to patient.
9:54:05  Vital chart was started
9:55:32  0.9% NaCl 100 ml/hr I.V. was administered by Mariya Adame RN; used for procedure;
9:55:38  Oxygen 2 l/min etCO2 Nasal cannula was administered by
Mariya Adame RN; used for procedure;
9:55:43  Lidocaine 2% 20ml vial added to field was administered
by Ivan Orosco MD; for local anesthetic;
9:55:48  Heparin Flush Bag (1000units/500ml NS) 2 bags added to
field was administered by Ivan Orosco MD; used for
procedure;
10:03:35 Baseline sample Acquired.
10:05:04 Rhythm: sinus rhythm
10:05:12 H&P Date Dictated: 2019 Within 30 days and on
 
chart..
10:05:14 Pre-procedure instructions explained to patient.
10:05:14 Pre-op teaching completed and patient verbalized
understanding.
10:05:15 Family in patients room.
10:05:17 Patient NPO since Midnight.
10:05:22 Patient allergic to No known allergies
10:05:24 Is the patient allergic to Iodine/contrast media? No.
10:05:25 Is patient on blood thinner?No
10:05:27 Patient diabetic? No.
10:05:29 Previous problem with sedation/anesthesia? No ?
10:05:30 Snore? Yes
10:05:30 Sleep apnea? No
10:05:31 Deviated septum? No
10:05:32 Opens mouth fully? Yes
10:05:38 Sticks out tongue? Yes
10:05:41 Airway obstruction? Yes COPD
10:05:45 Dentures? No ?
10:05:48 Pre procedure: right dorsailis pedis pulse 1+
Palpable, but thready & weak; easily obliterated
10:05:50 Patient pain scale 0/10 ?.
10:06:05 IV patent on arrival in left forearm with 0.9% NaCl at
KVO.
10:06:08 Lab results completed and on chart.
10:07:24 Right groin area was prepped with chlora-prep and
draped in sterile fashion
10:07:26 Alarms reviewed by R. N.
10:07:26 Sharps counted by scrub and verified by R.N.
10:07:30 Use device set Femoral Dx
10:07:31 ACIST Syringe (75365) opened to sterile field.
10:07:31 Bag Decanter (2002S) opened to sterile field.
10:07:32 Medline Cath Pack ([id number]) opened to sterile field.
10:07:34 ACIST Hand Control (75627) opened to sterile field.
10:07:34 ACIST Manifold (92766) opened to sterile field.
10:07:34 DIAGNOSTIC Multipack 5Fr catheter set (RE9948) opened
to sterile field.
10:07:35 Tegaderm 4 x 4 (1626W) opened to sterile field.
10:07:37 EMERALD Guide Wire (734-959) opened to sterile field.
10:07:37 SHEATH 5FR Bagley (WVK804) opened to sterile field.
10:10:04 Zero performed for pressure channel P1
10:26:32 Procedure type changed to Cath procedure, Diagnostic
procedure, LHC, LHC w/Coronaries, FFR/IVUS, FFR
Initial, PCI procedure, Coronary Stent, Coronary Stent
Initial, Peripheral Cath Diagnostic Procedure, Cath
Lab Peripheral Procedures, Four Vessel Arteriogram
10::36 Physician arrived
10::36 --------ALL STOP TIME OUT------
10::37 Final Timeout: patient, procedure, and site verified
with staff and physician. All members of the team are
in agreement.
10::38 Right groin site verified by team.
10::42 Fire Safety Assessment: A--An alcohol-based skin
anteseptic being used preoperatively., C--Open oxygen
or nitrous oxide is being used., D--An ESU, laser, or
fiber-optic light is being used.
10:26:45 Physical assessment completed. ASA score P 2 - A
patient with mild systemic disease as per Ivan Orosco MD.
10:26:52 2) 60-89 Mildly reduced kidney function, and other
findings (as for stage 1) point to kidney disease.
 
10:26:56 Maximum allowable contrast does (3.7 X eGFR X 0.75)202
ml.
10:26:59 Sedation plan: IV Moderate Sedation Medication:Versed,
Fentanyl
10:27:27 Versed 2 mg I.V. was administered by Mariya Adame RN;
for sedation;
10:27:31 Fentanyl 50 mcg I.V. was administered by Mariya Adame
RN; for sedation;
10:33:11 Versed 1 mg I.V. was administered by Mariya Adame RN;
for sedation;
10:33:15 Fentanyl 50 mcg I.V. was administered by Mariya Adame
RN; for sedation;
10:35:05 Procedure started.
10:35:05 Full Disclosure recording started
10:35:07 Local anesthetic to right femoral artery with
Lidocaine 2% by Ivan Orosco MD.**INITIAL ACCESS
ONLY**
10:35:14 A 5 Fr sheath was inserted into the Right Femoral
artery
10:35:26 A MULTIPACK JL 4.0 5Fr catheter was advanced over the
wire and used for Procedure.
10:36:12 LCA angiography performed.
10:37:28 Catheter exchanged over wire.
10:37:32 A MULTIPACK 3DRC 5Fr catheter was advanced over the
wire and used for Procedure.
10:38:02 RCA angiography performed.
10:38:29 INFLATOR Merit YouEyepak (UU3800) opened to sterile
field.
10:38:30 SHEATH 6FR Bagley (AGH752) opened to sterile field.
10:38:35 [place]cano Verrata Plus pressure wire (15199S) opened to
sterile field.
10:38:43 GUIDE 6FR XBLAD 4.0 catheter ([id number]) opened to
sterile field.
10:38:50 Left carotid angiography performed.
10:39:18 Right subclavian angiography performed
10:39:20 Right carotid angiography performed.
10:40:42 Catheter removed.
10:40:47 Sheath upsized to a 6 Fr Short.
10:40:56 6 Fr XBLAD 4 guide catheter was inserted over the wire
10:41:07 FFR/IFR wire advanced.
10:42:41 Wire advanced across lesion.
10:44:11 pLAD lesion measured at 0.93 with IFR
10:44:17 dLAD lesion measured at 0.83 with IFR
10:46:04 Heparin Bolus 4000 units I.V. was administered by
Mariya Adame RN; for anticoagulation; verified with
Dr. Orosco
10:46:17 Integrilin (Bolus 2mg/ml) 6.8 ml was administered by
Mariya Adame RN; ; wasted 3.2mL
10:46:20 Place stent Inflation Number: 1 A ALVAREZ RX 2.25 x 12
stent ([id number]) was prepped and advanced across
the Dist LAD . The stent was deployed at 15 DAVID for
0:10 (min:sec) .
10:46:25 Inflation number: 2 The stent balloon was then
re-inflated across the Dist LAD to 15 DAVID for 0:10
(min:sec) .
10:46:38 Plavix 600 mg P.O. was administered by Mariya Adame
RN; for antiplatelet therapy;
10:46:50 Stent catheter was removed intact over wire.
10:47:20 Wire removed.
10:47:21 Guide catheter removed.
 
10:47:28 EXOSEAL 6Fr () opened to sterile field.
10:47:35 Sheath removed intact; hemostasis achieved with
Exoseal to the Right Femoral artery.
10:47:36 Procedure ended.(Physican Out)
10:49:29 Fluoroscopy time 04.00 minutes.
10:49:33 Flurop Dose total: 589
10:49:33 Fluoroscopy dose: 589 mGy
10:49:40 Contrast amount:Isovue 370 95ml.
10:49:46 Sharps counted by scrub and verified by R.N.
10:49:49 Insertion/operative site no bleeding no hematoma.
10:49:52 Post-op/insertion site Right Femoral artery dressed
using a 4 x 4 and Tegaderm.
10:49:55 Post right femoral artery:stable, soft, clean and dry
10:49:56 Post Procedure Pulses reassessed and unchanged
10:49:57 Post-procedure physical assessment completed. ASA
score P 2 - A patient with mild systemic disease as
per Ivan Orosco MD.
10:49:59 Post procedure rhythm: unchanged.
10:50:02 Estimated blood loss: 10 ml
10:50:03 Post procedure instruction explained to
patient.Patient verbalizes understanding.
10:50:04 Patient needs reinforcement of post procedure
teaching.
10:50:16 Pre PCI Site: Native dLAD has 80% stenosis.
10:51:21 Post PCI Site: Native dLAD has 0% stenosis.
10:53:34 Procedure and supply charges have been captured,
reviewed, submitted and are correct.
10:53:36 Procedure Complication : No complications
10:53:37 Vital chart was stopped
10:53:38 See physician's report for complete and final results.
10:53:40 Report given to PCU.
10:53:42 Patient transfered to PCU with Stretcher.
10:53:44 Procedure ended.
10:53:44 Full Disclosure recording stopped
10:53:48 End room use (Document Last)
Intervention Summary
Intervention Notes
Time     ActionType  Lesion and  Equipment Used Action#  Pressure  Duration
Attributes
10:46:20 Place stent Dist LAD    ALVAREZ RX 2.25 x 1        15        00:10
12 stent
([id number])
10:46:25 Reinflate   Dist LAD    ALVAREZ RX 2.25 x 2        15        00:10
stent                   12 stent
balloon                 ([id number])
Device Usage
Item Name      Manufacture  Quantity  Catalog       Hospital Part       Current 
Minimal Lot# /
Number        Charge   Number     Stock   Stock   Serial#
Code
ACIST Syringe  Acist        1         24777         954569   527913     519541  
20
(18127)        Medical
Systems Inc
Bag Decanter   Microtek     1                  129976   47366      442435  
5
()        Medical Inc.
Medline Cath   Medline      1         [id number]     283403   54040      012316  
5
Pack
 
([id number])
ACIST Hand     Acist        1         37311         418363   985272     975056  
5
Control        Medical
(15507)        Systems Inc
ACIST Manifold Acist        1         75413         331411   262673     971796  
5
(94760)        Medical
Systems Inc
DIAGNOSTIC     Cardinal     1         EB9300        453540   53649      581017  
30
Multipack 5Fr  Health
catheter set
(JP7770)
Tegaderm 4 x 4 3M           1         1626W         673548   965887     861912  
5
(1626W)
EMERALD Guide  Cardinal     1         502-455       936888   143234     189079  
5
Wire (502-455) Health
SHEATH 5FR     Terumo       1         WOB576        862738   221918     598862  
5
Bagley
(RWL316)
MULTIPACK JL   Cardinal     1                                           056112  
5
4.0 5Fr        Health
catheter
MULTIPACK 3DRC Cardinal     1                                           879659  
5
5Fr catheter   Health
INFLATOR Merit Merit        1         PD6265        695318   231039     342953  
15
[employer]    Medical
(IO6274)
SHEATH 6FR     Terumo       1         NUY533        355485   663192     102139  
40
Bagley
(RPR225)
Homer        Homer      1         05193U        535143   383679373  188796  
5
Verrata Plus
pressure wire
(80257O)
GUIDE 6FR      Cardinal     1         [id number]      539515   651544     445724  
3
XBLAD 4.0      Health
catheter
([id number])
ALVAREZ RX 2.25 x Medtronic    1         [id number]  675787   8956230    805655  
5       [id number]
12 stent
([id number])
EXOSEAL 6Fr    Cardinal     1                  885874   763476     701366  
10
()        Health
Signature Audit [place]
Stage           Time        Signature      Unsigned
Intra-Procedure 2019   Torrey Elizalde
10:54:45 AM RT(R)
 
Signatures
Performing Physician :  Signature :
Ivan Orosco MD        ______________________________
Date : ______________ Time :
______________
Monitor : Torrey Elizalde RT Signature :
______________________________
Date : ______________ Time :
______________
Nurse : Mariya Adame RN Signature :
______________________________
Date : ______________ Time :
______________
 
 
 
 
 
 
 
 
 
 
 
 
 
 
 
 
 
 
 
 
 
 
 
 
 
 
 
 
 
 
 
 
 
 
 
 
 
 
 
 
 
 
[address] [place] [address]

## 2019-07-15 NOTE — ST
PATIENT:DARRIN KENNEDY                       MEDICAL RECORD: [id number]
SEX: M                                                   [facility]
ORDER #:                                                 ADMISSION DATE: 07/08/19
AGE OF PATIENT: 58            
 
REFERRING PHYSICIAN:                                                             
 
INTERPRETING PHYSICIAN: DALJIT HUNTLEY MD             

 
 
DATE OF SERVICE:  07/09/2019
 
Nuclear Stress Test
 
INDICATIONS:  Angina, coronary artery disease, and cardiomyopathy.
 
PROCEDURE IN DETAIL:  He was exercised on standard Lexiscan protocol with 33 mCi
of sestamibi injected at peak stress and 13 mCi were used previously for rest
images.
 
FINDINGS:  Gated SPECT reveals dilated cardiomyopathy, ejection fraction 37%
with decreased thickening and brightening throughout the inferior and apical
segments.
 
SPECT Imaging:  Cardiolite was used as myocardial perfusion agent.  There is a
definite fixed perfusion defect inferiorly and apically from a previous
inferoapical myocardial infarction.  There is no evidence of reversible ischemia
in fact this area undergoes reverse redistribution and improves with stress. 
The remaining segments looked homogeneous at rest and stress.
 
OVERALL IMPRESSION:  This is an abnormal nuclear stress test only in that it
shows a previous inferoapical myocardial infarction, no ongoing ischemic burden,
dilated cardiomyopathy, ejection fraction 37%.  Standard medical management on
treatment of the cardiomyopathy.
 
TRANSINT:[id number] Voice Confirmation ID: [id number] DOCUMENT ID: [id number]
 
 
                                           
                                           DALJIT HUNTLEY MD             
 
 
 
Electronically Signed by DALJIT HUNTLEY on 07/15/19 at 1838
 
 
 
 
 
CC:                                                             [id number]
DICTATION DATE: 07/09/19 1620     :     07/10/19 0227      DIS IN  
                                                                      07/11/19
[address] [place] [address]
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
## 2019-07-15 NOTE — MORECARE
CASE MANAGEMENT DISCHARGE SUMMARY
 
 
PATIENT: DARRIN KENNEDY                 UNIT: [id number]
ACCOUNT#: [id number]                       ADM DATE: 07/15/19
AGE: 58     : 60  SEX: M            ROOM/BED: D.2121    
AUTHOR: MARGARITA,DOC                             PHYSICIAN:                               
 
REFERRING PHYSICIAN: MIGEL GARCIA MD               
DATE OF SERVICE: 07/15/19
Discharge Plan
 
 
Patient Name: DARRIN KENNEDY
Facility: [facility]
Encounter #: [id number]
Medical Record #: [id number]
: 1960
Planned Disposition: Home
Anticipated Discharge Date: 19
 
Discharge Date: 
Expected LOS: 2
Initial Reviewer: [id number]
Initial Review Date: 07/15/2019
Generated: 7/15/19   8:06 pm 
DCP- Discharge Planning
 
Updated by [email or web address]: Nica Deutsch on 7/15/19   6:04 pm CT
Patient Name: DARRIN KENNEDY                                    
Admission Status: ER  
Accout number: [id number]                             
Admission Date: 07-  
: 1960                                                       
Admission Diagnosis:  
Attending: MIGEL GARCIA                                               
Current LOS:  1  
 
Anticipated DC Date: 2019  
Planned Disposition: Home  
Primary Insurance: [employer]  
 
 
Discharge Planning Comments:  
 
CM met with patient and his wife to complete initial dc planning assessment.  
CM educated patient on the CM role and verbal consent given by patient to 
complete assessment.  CM verified patient's address, phone number, and 
emergency contact phone numbers.  Patient lives at home with his wife and has 
been independent in his care.  At discharge patient plans to return home and 
 
feels this is a safe discharge.  CM discussed availability of home health, 
rehab services, and medical equipment. Patient denied known discharge needs 
at this time. His wife reports she  will transport him home at time of 
discharge. CM will continue to follow and will assist as needed with dc 
plans/needs.   
 
: Nica Deutsch RN, [facility]
 DCPIA - Discharge Planning Initial Assessment
 
Updated by [id number]: Nica Deutsch on 7/15/19   6:53 pm
*  Is the patient Alert and Oriented?
Yes
*  How many steps to enter\exit or inside your home?
 
*  PCP
Dr. Myers
*  Pharmacy
[facility]
*  Preadmission Environment
Home with Family
*  ADLs
Independent
*  Equipment
None
*  List name and contact numbers for known caregivers / representatives who 
currently or will assist patient after discharge:
Anabell Kennedy - wife - [phone]
*  Verbal permission to speak to the caregivers and representatives has been 
obtained from the patient.
Yes
*  Community resources currently utilized
None
*  Additional services required to return to the preadmission environment?
No
*  Can the patient safely return to the preadmission environment?
Yes
*  Has this patient been hospitalized within the prior 30 days at any 
hospital?
Yes
 
 
 
 
 
 
 
Last DP export: 7/15/19   5:58 p
Patient Name: DARRIN KENNEDY
 
Encounter #: [id number]
Page [id number]
 
 
 
 
 
Electronically Signed by GISSELL AVILA on 07/15/19 at 1906
 
 
 
 
 
 
**All edits/amendments must be made on the electronic document**
 
DICTATION DATE: 07/15/19 1905     : KATRINA  07/15/19 1905     
RPT#: [id number]                                DC DATE:        
                                               STATUS: ADM IN  
[facility]
 [place] [address]
***END OF REPORT***

## 2019-07-15 NOTE — MORECARE
CASE MANAGEMENT DISCHARGE SUMMARY
 
 
PATIENT: DARRIN KENNEDY                 UNIT: [id number]
ACCOUNT#: [id number]                       ADM DATE: 07/15/19
AGE: 58     : 60  SEX: M            ROOM/BED: D.2121    
AUTHOR: GISSELL AVILA                             PHYSICIAN:                               
 
REFERRING PHYSICIAN: MIGEL GARCIA MD               
DATE OF SERVICE: 07/15/19
Discharge Plan
 
 
Patient Name: DARRIN KENNEDY
[facility]
Encounter #: [id number]
Medical Record #: [id number]
: 1960
Planned Disposition: Home
Anticipated Discharge Date: 19
 
Discharge Date: 
Expected LOS: 2
Initial Reviewer: [id number]
Initial Review Date: 07/15/2019
Generated: 7/15/19   7:58 pm 
 DCPIA - Discharge Planning Initial Assessment
 
Updated by [id number]: Nica Deutsch on 7/15/19   6:53 pm
*  Is the patient Alert and Oriented?
Yes
*  How many steps to enter\exit or inside your home?
 
*  PCP
Dr. Myers
*  Pharmacy
[facility]
*  Preadmission Environment
Home with Family
*  ADLs
Independent
*  Equipment
None
*  List name and contact numbers for known caregivers / representatives who 
currently or will assist patient after discharge:
Anabell Kennedy - wife - [phone]
*  Verbal permission to speak to the caregivers and representatives has been 
obtained from the patient.
Yes
*  Community resources currently utilized
 
None
*  Additional services required to return to the preadmission environment?
No
*  Can the patient safely return to the preadmission environment?
Yes
*  Has this patient been hospitalized within the prior 30 days at any 
hospital?
Yes
 
 
 
 
 
 
 
Last DP export: 7/15/19   5:51 p
Patient Name: DARRIN KENNEDY
Encounter #: [id number]
Page [id number]
 
 
 
 
 
Electronically Signed by GISSELL AVILA on 07/15/19 at 1858
 
 
 
 
 
 
**All edits/amendments must be made on the electronic document**
 
DICTATION DATE: 07/15/19 1858     : KATRINA  07/15/19 1858     
RPT#: [id number]                                DC DATE:        
                                               STATUS: ADM IN  
[facility]
 [place] [address]
***END OF REPORT***

## 2019-07-15 NOTE — CN
PATIENT NAME:DARRIN KENNEDY                           MEDICAL RECORD: [id number]
: 60                                              LOCATION:D. D.2135
ADMIT DATE: 19                                       ACCOUNT: [id number]
CONSULTING PHYSICIAN:    DALJIT HUNTLEY MD             
                                               
REFERRING PHYSICIAN:     DELGADO CISNEROS MD                
 
 
DATE OF CONSULTATION:  2019
 
CARDIOLOGY CONSULTATION
 
DIAGNOSES:
1.  Angina.
2.  Coronary artery disease.
3.  Previous multivessel PTCA and stent.
4.  Valvular heart disease, aortic valve replacement, mechanical prosthesis.
5.  Coumadin anticoagulation, aortic valve replacement.
6.  Hypertension.
7.  Hyperlipidemia.
8.  Cardiomyopathy - congestive heart failure.
9.  Smoking.
10.  Chronic obstructive pulmonary disease.
11.  Anemia.
 
HISTORY OF PRESENT ILLNESS:  Mr. Kennedy is well known to us with history of
coronary artery disease, previous stent, last being August of last year.  He has
had a problem with anemia as of recent.  Dr. Myers has been doing workup for
the anemia.  His hemoglobin was at 1.5, it is now 11.2.  He has been having
chest pain and chest discomfort.  This is a new problem and this has happened
over the past month.  His last stent was in August.  His Plavix has been
discontinued.  His Coumadin has been continued.  His INR is 2.5.  He had a
recent echocardiogram showing the valve was with normal structure and motion. 
Ejection fraction was in the 35% range.  He has not had a reevaluation from the
standpoint of ischemic heart disease since the stent.  His heart rate is in the
60s.  Systolic blood pressure is 115.  Hence, there is little room to continue
with medical management for the anginal symptomatology.  He is on enalapril from
the standpoint of hypertension.
 
PHYSICAL EXAMINATION:
GENERAL APPEARANCE:  Well-nourished, well-developed, appears stated age.  Level
of distress, comfortable. 
PSYCHIATRIC:  Mental status, alert, normal affect.  Orientation, oriented to
time, place and person.  
EYES:  Lids and conjunctiva, noninjected.  No discharge, no pallor. 
ENT:  Lips, teeth, gums, normal dentition.  Oropharynx, no cyanosis, no pallor. 
NECK:  Carotid arteries, bilateral normal upstroke, no bruits, no thrills. 
JUGULAR VEINS:  No jugular venous pressure or distention. 
CERVICAL LYMPH NODES:  Nontender, nonenlarged.  
THYROID:  Not enlarged.  Nontender.  No nodules. 
LUNGS:  Respiratory effort, unlabored. 
CHEST:  Normal curvature.  No thoracic deformity.  No chest wall tenderness. 
Percussion, resonant.  Auscultation, clear.  No wheezes, no rales, no rhonchi. 
CARDIOVASCULAR:  Precordial exam, nondisplaced.  No heaves or pericardial
thrills.  Rate and rhythm, regular.  Heart sounds, normal S1, normal S2.  No S3,
no gallop, no rub.  Systolic murmur, not heard.  Diastolic murmur, not heard. 
EXTREMITIES:  No cyanosis, no edema.  Peripheral pulses, full and equal in all
extremities, except as noted.  No bruits appreciated. 
 
 
 
CONSULT REPORT                                 [id number]    DARRIN KENNEDY RAY         
 
 
ABDOMEN:  Soft, nondistended.  Normal aorta.  No bruit.  Nontender.  No masses. 
Liver, nontender, no hepatomegaly.  Spleen, nontender, no splenomegaly.  
MUSCULOSKELETAL:  No joint tenderness.  No joint swelling.  No erythema.  
NEUROLOGICAL:  Normal gait, normal strength, normal tone.
SKIN:  Warm and dry.
 
OVERALL IMPRESSION:  Anginal symptomatology.  At this time, we will risk
stratify with stress testing, Cardiolite imaging.  Further care depends upon the
findings of the stress test.
 
TRANSINT:[id number] Voice Confirmation ID: [id number] DOCUMENT ID: [id number]
                                           
                                           DALJIT HUNTLEY MD             
 
 
 
Electronically Signed by DALJIT HUNTLEY on 07/15/19 at 1838
 
 
 
 
 
 
 
 
 
 
 
 
 
 
 
 
 
 
 
 
 
 
 
 
 
 
 
 
 
 
CC:                                                             [id number]
DICTATION DATE: 19 08     :     19 0828      DIS IN  
                                                                      19
[place] [id number] [place] [phone]

## 2019-07-16 VITALS — DIASTOLIC BLOOD PRESSURE: 72 MMHG | SYSTOLIC BLOOD PRESSURE: 127 MMHG

## 2019-07-16 VITALS — SYSTOLIC BLOOD PRESSURE: 112 MMHG | DIASTOLIC BLOOD PRESSURE: 58 MMHG

## 2019-07-16 VITALS — DIASTOLIC BLOOD PRESSURE: 62 MMHG | SYSTOLIC BLOOD PRESSURE: 109 MMHG

## 2019-07-16 VITALS — SYSTOLIC BLOOD PRESSURE: 97 MMHG | DIASTOLIC BLOOD PRESSURE: 59 MMHG

## 2019-07-16 VITALS — DIASTOLIC BLOOD PRESSURE: 66 MMHG | SYSTOLIC BLOOD PRESSURE: 132 MMHG

## 2019-07-16 VITALS — SYSTOLIC BLOOD PRESSURE: 119 MMHG | DIASTOLIC BLOOD PRESSURE: 61 MMHG

## 2019-07-16 LAB
ALBUMIN SERPL-MCNC: 3.2 G/DL (ref 3.4–5)
ALP SERPL-CCNC: 60 U/L (ref 46–116)
ALT SERPL-CCNC: 29 U/L (ref 10–68)
ANION GAP SERPL CALC-SCNC: 9.1 MMOL/L (ref 8–16)
BASOPHILS NFR BLD AUTO: 0.4 % (ref 0–2)
BILIRUB SERPL-MCNC: 0.25 MG/DL (ref 0.2–1.3)
BUN SERPL-MCNC: 19 MG/DL (ref 7–18)
CALCIUM SERPL-MCNC: 8.6 MG/DL (ref 8.5–10.1)
CHLORIDE SERPL-SCNC: 102 MMOL/L (ref 98–107)
CK MB SERPL-MCNC: 0.9 U/L (ref 0–3.6)
CK SERPL-CCNC: 53 UL (ref 21–232)
CO2 SERPL-SCNC: 33.6 MMOL/L (ref 21–32)
CREAT SERPL-MCNC: 1.1 MG/DL (ref 0.6–1.3)
EOSINOPHIL NFR BLD: 5.2 % (ref 0–7)
ERYTHROCYTE [DISTWIDTH] IN BLOOD BY AUTOMATED COUNT: 17.1 % (ref 11.5–14.5)
GLOBULIN SER-MCNC: 3.5 G/L
GLUCOSE SERPL-MCNC: 93 MG/DL (ref 74–106)
HCT VFR BLD CALC: 35.1 % (ref 42–54)
HGB BLD-MCNC: 11.2 G/DL (ref 13.5–17.5)
IMM GRANULOCYTES NFR BLD: 0 % (ref 0–5)
LYMPHOCYTES NFR BLD AUTO: 35.1 % (ref 15–50)
MCH RBC QN AUTO: 29.2 PG (ref 26–34)
MCHC RBC AUTO-ENTMCNC: 31.9 G/DL (ref 31–37)
MCV RBC: 91.4 FL (ref 80–100)
MONOCYTES NFR BLD: 9.1 % (ref 2–11)
NEUTROPHILS NFR BLD AUTO: 50.2 % (ref 40–80)
OSMOLALITY SERPL CALC.SUM OF ELEC: 280 MOSM/KG (ref 275–300)
PLATELET # BLD: 197 10X3/UL (ref 130–400)
PMV BLD AUTO: 10.4 FL (ref 7.4–10.4)
POTASSIUM SERPL-SCNC: 4.7 MMOL/L (ref 3.5–5.1)
PROT SERPL-MCNC: 6.7 G/DL (ref 6.4–8.2)
RBC # BLD AUTO: 3.84 10X6/UL (ref 4.2–6.1)
SODIUM SERPL-SCNC: 140 MMOL/L (ref 136–145)
TROPONIN I SERPL-MCNC: 0.08 NG/ML (ref 0–0.06)
WBC # BLD AUTO: 6.9 10X3/UL (ref 4.8–10.8)

## 2019-07-16 NOTE — NUR
ASSESSMENT COMPLETE, PT A&O.  RESPERATIONS EVEN ON RA.  IV TO RIGHT AC WITH
IRON INFUSING.  IV SITE CLEAN AND DRY.  LEFT ARM 22 GUAGE SL.  PT CURRENTLY
DENIES PAIN OR NEEDS.  REMINDED PT NOTHING TO EAT OR DRINK AFTER MN FOR CATH
IN AM, PT STATED UNDERSTANDING.

## 2019-07-16 NOTE — NUR
SPOKE WITH DR. HUNTLEY. HEPARIN DC'D. LOVANOX TO BE GIVEN TONIGHT AND AND HELD
IN THE AM. PT ALERT AND ORIENTED WIFE AT BEDSIDE. PT DENIES ANY FURTHER NEEDS
AT THIS TIME. BED LOW CALL LIGHT WITHIN REACH. WILL CONTINUE TO MONITOR.

## 2019-07-16 NOTE — NUR
PT LAYING IN BED ALERT AND ORIENTED. PT TALKIN ON THE PHONE. PT DENIES ANY
PAIN OR NEEDS AT THIS TIME. BED LOW CALL LIGHT WITHIN REACH. WILL CONTINUE TO
MONITOR.

## 2019-07-16 NOTE — NUR
RESTS IN BED WITH CALL LIGHT IN REACH.  HEPRIN GTT INFUSING.  FAMILY AT BS.
WILL CONT. PLAN OF CARE.

## 2019-07-16 NOTE — NUR
HS MEDS GIVEN WITH FRESH ICE WATER.  DILAUDID 1 MG GIVEN FOR C/O PAIN.  2
ORANGE POPSICLES GIVEN AT PT REQUEST.

## 2019-07-16 NOTE — NUR
PT COMPLAINS OF 8/10 PAIN IN BACK AND HIP. CALLED ALEN SUTHERLAND. NORCO 
RE-ESTABLISHED. PT ALERT AND ORIENTED RR EVEN AND UNLABORED. VITALS STABLE.
WILL CONTINUE TO MONITOR.

## 2019-07-17 VITALS — DIASTOLIC BLOOD PRESSURE: 76 MMHG | SYSTOLIC BLOOD PRESSURE: 138 MMHG

## 2019-07-17 VITALS — SYSTOLIC BLOOD PRESSURE: 115 MMHG | DIASTOLIC BLOOD PRESSURE: 71 MMHG

## 2019-07-17 VITALS — SYSTOLIC BLOOD PRESSURE: 135 MMHG | DIASTOLIC BLOOD PRESSURE: 78 MMHG

## 2019-07-17 VITALS — DIASTOLIC BLOOD PRESSURE: 76 MMHG | SYSTOLIC BLOOD PRESSURE: 124 MMHG

## 2019-07-17 VITALS — DIASTOLIC BLOOD PRESSURE: 54 MMHG | SYSTOLIC BLOOD PRESSURE: 93 MMHG

## 2019-07-17 VITALS — DIASTOLIC BLOOD PRESSURE: 65 MMHG | SYSTOLIC BLOOD PRESSURE: 126 MMHG

## 2019-07-17 LAB
ANION GAP SERPL CALC-SCNC: 10.4 MMOL/L (ref 8–16)
BASOPHILS NFR BLD AUTO: 0.2 % (ref 0–2)
BUN SERPL-MCNC: 21 MG/DL (ref 7–18)
CALCIUM SERPL-MCNC: 8.4 MG/DL (ref 8.5–10.1)
CHLORIDE SERPL-SCNC: 99 MMOL/L (ref 98–107)
CO2 SERPL-SCNC: 29.7 MMOL/L (ref 21–32)
CREAT SERPL-MCNC: 1.1 MG/DL (ref 0.6–1.3)
EOSINOPHIL NFR BLD: 2.1 % (ref 0–7)
ERYTHROCYTE [DISTWIDTH] IN BLOOD BY AUTOMATED COUNT: 17.1 % (ref 11.5–14.5)
GLUCOSE SERPL-MCNC: 111 MG/DL (ref 74–106)
HCT VFR BLD CALC: 36 % (ref 42–54)
HGB BLD-MCNC: 11.5 G/DL (ref 13.5–17.5)
IMM GRANULOCYTES NFR BLD: 0.2 % (ref 0–5)
INR PPP: 1.48 (ref 0.85–1.17)
LYMPHOCYTES NFR BLD AUTO: 13.9 % (ref 15–50)
MCH RBC QN AUTO: 29.1 PG (ref 26–34)
MCHC RBC AUTO-ENTMCNC: 31.9 G/DL (ref 31–37)
MCV RBC: 91.1 FL (ref 80–100)
MONOCYTES NFR BLD: 7.2 % (ref 2–11)
NEUTROPHILS NFR BLD AUTO: 76.4 % (ref 40–80)
OSMOLALITY SERPL CALC.SUM OF ELEC: 273 MOSM/KG (ref 275–300)
PLATELET # BLD: 203 10X3/UL (ref 130–400)
PMV BLD AUTO: 10.3 FL (ref 7.4–10.4)
POTASSIUM SERPL-SCNC: 4.1 MMOL/L (ref 3.5–5.1)
PROTHROMBIN TIME: 17.3 SECONDS (ref 11.6–15)
RBC # BLD AUTO: 3.95 10X6/UL (ref 4.2–6.1)
SODIUM SERPL-SCNC: 135 MMOL/L (ref 136–145)
WBC # BLD AUTO: 9.7 10X3/UL (ref 4.8–10.8)

## 2019-07-17 PROCEDURE — 027034Z DILATION OF CORONARY ARTERY, ONE ARTERY WITH DRUG-ELUTING INTRALUMINAL DEVICE, PERCUTANEOUS APPROACH: ICD-10-PCS | Performed by: INTERNAL MEDICINE

## 2019-07-17 PROCEDURE — B31G1ZZ FLUOROSCOPY OF BILATERAL VERTEBRAL ARTERIES USING LOW OSMOLAR CONTRAST: ICD-10-PCS | Performed by: INTERNAL MEDICINE

## 2019-07-17 PROCEDURE — 4A033BC MEASUREMENT OF ARTERIAL PRESSURE, CORONARY, PERCUTANEOUS APPROACH: ICD-10-PCS | Performed by: INTERNAL MEDICINE

## 2019-07-17 PROCEDURE — 4A023N7 MEASUREMENT OF CARDIAC SAMPLING AND PRESSURE, LEFT HEART, PERCUTANEOUS APPROACH: ICD-10-PCS | Performed by: INTERNAL MEDICINE

## 2019-07-17 PROCEDURE — B3151ZZ FLUOROSCOPY OF BILATERAL COMMON CAROTID ARTERIES USING LOW OSMOLAR CONTRAST: ICD-10-PCS | Performed by: INTERNAL MEDICINE

## 2019-07-17 PROCEDURE — B2111ZZ FLUOROSCOPY OF MULTIPLE CORONARY ARTERIES USING LOW OSMOLAR CONTRAST: ICD-10-PCS | Performed by: INTERNAL MEDICINE

## 2019-07-17 PROCEDURE — B2151ZZ FLUOROSCOPY OF LEFT HEART USING LOW OSMOLAR CONTRAST: ICD-10-PCS | Performed by: INTERNAL MEDICINE

## 2019-07-17 PROCEDURE — B31C1ZZ FLUOROSCOPY OF BILATERAL EXTERNAL CAROTID ARTERIES USING LOW OSMOLAR CONTRAST: ICD-10-PCS | Performed by: INTERNAL MEDICINE

## 2019-07-17 NOTE — NUR
PT RETURN FROM CATH LAB. RIGHT FEM CATH SITE IS C/D/I WITH NO S/S OF HEMATOMA
PRESENT. PERIPHERAL PULSES ARE BILATERALLY EVEN. PT DENIES ANY NEEDS. NO S/S
OF DISTRESS NOTED. WILL CTM.

## 2019-07-17 NOTE — NUR
REPORT RECIEVED. PT SITTING SEMI FOWLERS IN BED WITH FAMILY AT BEDSIDE. RR
EVEN AND UNLABORED. CALL LIGHT WITHIN REACH. SIDE RAIL UP X2. NO DISTRESS
NOTED. PT DENIES ANY NEEDS AT THIS TIME. L FA AND R AC PIV SALINE LOCKED. PT
NPO FOR HEART CATH. WILL CTM.

## 2019-07-18 VITALS — DIASTOLIC BLOOD PRESSURE: 55 MMHG | SYSTOLIC BLOOD PRESSURE: 104 MMHG

## 2019-07-18 VITALS — DIASTOLIC BLOOD PRESSURE: 62 MMHG | SYSTOLIC BLOOD PRESSURE: 116 MMHG

## 2019-07-18 VITALS — DIASTOLIC BLOOD PRESSURE: 64 MMHG | SYSTOLIC BLOOD PRESSURE: 114 MMHG

## 2019-07-18 VITALS — SYSTOLIC BLOOD PRESSURE: 117 MMHG | DIASTOLIC BLOOD PRESSURE: 55 MMHG

## 2019-07-18 VITALS — SYSTOLIC BLOOD PRESSURE: 118 MMHG | DIASTOLIC BLOOD PRESSURE: 68 MMHG

## 2019-07-18 LAB
ANION GAP SERPL CALC-SCNC: 8.8 MMOL/L (ref 8–16)
BASOPHILS NFR BLD AUTO: 0.3 % (ref 0–2)
BUN SERPL-MCNC: 21 MG/DL (ref 7–18)
CALCIUM SERPL-MCNC: 8.2 MG/DL (ref 8.5–10.1)
CHLORIDE SERPL-SCNC: 101 MMOL/L (ref 98–107)
CO2 SERPL-SCNC: 32.2 MMOL/L (ref 21–32)
CREAT SERPL-MCNC: 1.1 MG/DL (ref 0.6–1.3)
EOSINOPHIL NFR BLD: 5.1 % (ref 0–7)
ERYTHROCYTE [DISTWIDTH] IN BLOOD BY AUTOMATED COUNT: 17.1 % (ref 11.5–14.5)
GLUCOSE SERPL-MCNC: 99 MG/DL (ref 74–106)
HCT VFR BLD CALC: 36.3 % (ref 42–54)
HGB BLD-MCNC: 11.4 G/DL (ref 13.5–17.5)
IMM GRANULOCYTES NFR BLD: 0.2 % (ref 0–5)
INR PPP: 1.35 (ref 0.85–1.17)
LYMPHOCYTES NFR BLD AUTO: 28 % (ref 15–50)
MCH RBC QN AUTO: 28.8 PG (ref 26–34)
MCHC RBC AUTO-ENTMCNC: 31.4 G/DL (ref 31–37)
MCV RBC: 91.7 FL (ref 80–100)
MONOCYTES NFR BLD: 9.3 % (ref 2–11)
NEUTROPHILS NFR BLD AUTO: 57.1 % (ref 40–80)
OSMOLALITY SERPL CALC.SUM OF ELEC: 278 MOSM/KG (ref 275–300)
PLATELET # BLD: 207 10X3/UL (ref 130–400)
PMV BLD AUTO: 10.3 FL (ref 7.4–10.4)
POTASSIUM SERPL-SCNC: 4 MMOL/L (ref 3.5–5.1)
PROTHROMBIN TIME: 16.1 SECONDS (ref 11.6–15)
RBC # BLD AUTO: 3.96 10X6/UL (ref 4.2–6.1)
SODIUM SERPL-SCNC: 138 MMOL/L (ref 136–145)
WBC # BLD AUTO: 6.4 10X3/UL (ref 4.8–10.8)

## 2019-07-18 NOTE — NUR
ASSESSMENT COMPLETED. ALERT AND ORIENTED. TELEMERTY SHOWS SR77. RIGHT GROIN
SOFT WITH DRSG DRY AND INTACT.UP AB BRISEIDA. SL TO BOTH RIGHT AND LEFT AR. UP AB
BRISEIDA. CALL LIGHT IN REACH WITH SR UP

## 2019-07-18 NOTE — NUR
PT DISCHARGED. IVS TAKEN OUT WITH TIPS INTACT. INSTRUCTIONS GIVEN TO PT AND
WIFE. TO PRIVATE CAR PER WHEELCHAIR

## 2019-07-19 NOTE — OP
PATIENT NAME:  DARRIN KENNEDY                       MEDICAL RECORD: [id number]
:60                                             LOCATION:D.M2     D.2121
                                                         ADMISSION DATE:07/15/19
SURGEON:  DALJIT HUNTLEY MD             
 
 
DATE OF OPERATION:  2019
 
PROCEDURE:  Four-vessel carotid and vertebral angiography.
 
INDICATION:  TIA.
 
PROCEDURE IN DETAIL:  After informed consent was obtained and after a detailed
description of risks, benefits as well as alternative therapies, the patient
elected to proceed with angiogram.  The patient recently had a TIA.  I discussed
the case with Dr. Johnson.  He agrees that 4-vessel was warranted and wants us to
proceed with 4-vessel.
 
FINDINGS:
RIGHT SIDE:  Common internal and external carotids have mild plaquing, none
greater than 10%, no flow-limiting stenosis.  Vertebral artery has no
significant disease.
LEFT SYSTEM:  The common internal and external carotids have mild plaquing, no
flow-limiting stenosis.  Vertebral arteries devoid of disease.
 
OVERALL IMPRESSION:  No significant carotid vascular disease, carotid vascular
insufficiency is not the etiology of the TIA.
 
TRANSINT:[id number] Voice Confirmation ID: [id number] DOCUMENT ID: [id number]
                                           
                                           DALJIT HUNTLEY MD             
 
 
 
Electronically Signed by DALJIT HUNTLEY on 19 at 1651
 
 
 
 
 
 
 
 
 
 
 
 
 
 
 
CC:                                                             [id number]
DICTATION DATE: 19 1110     :     19 1138      DIS IN  
                                                                      19
[facility]                                          
[address] [place] [address]

## 2019-07-19 NOTE — CN
PATIENT NAME:DARRIN KENNEDY                           MEDICAL RECORD: [id number]
: 60                                              LOCATION:D. D.2121
ADMIT DATE: 07/15/19                                       ACCOUNT: [id number]
CONSULTING PHYSICIAN:    DALJIT HUNTLEY MD             
                                               
REFERRING PHYSICIAN:     MIGEL GARCIA MD               
 
 
DATE OF CONSULTATION:  2019
 
DIAGNOSES:
1.  Non-Q-wave myocardial infarction.
2.  Coronary artery disease.
3.  Previous percutaneous transluminal coronary angioplasty stent.
4.  Aortic valve replacement, mechanical prosthesis.
5.  Coumadin anticoagulation.
6.  Transient ischemic attack.
7.  Hypertension.
8.  Gastroesophageal reflux disease.
 
HISTORY OF PRESENT ILLNESS:  Mr. Kennedy presents with TIA symptomatology.  He
as well has been having chest pain.  He was in approximately a week and a half
ago with episodes of chest pain.  At that time, we did a nuclear stress test
that was overall normal.  He has continued to have the chest pain and that the
troponin is now positive for a non-Q-wave myocardial infarction.  He as well,
presented with TIA symptomatology.  He has an aortic valve.  His INR is only
1.5.  He has had a problem with anemia, but that has been stable, hemoglobin
11.2.
 
PHYSICAL EXAMINATION:
GENERAL APPEARANCE:  Well-nourished, well-developed, appears stated age.  Level
of distress, comfortable.
PSYCHIATRIC:  Mental status, alert, normal affect.  Orientation, oriented to
time, place and person.
EYES:  Lids and conjunctiva, noninjected.  No discharge, no pallor.
ENT:  Lips, teeth, gums, normal dentition.  Oropharynx, no cyanosis, no pallor.
NECK:  Carotid arteries, bilateral normal upstroke, no bruits, no thrills.
JUGULAR VEINS:  No jugular venous pressure or distention.
CERVICAL LYMPH NODES:  Nontender, nonenlarged.
THYROID:  Not enlarged.  Nontender.  No nodules.
LUNGS:  Respiratory effort, unlabored.
CHEST:  Normal curvature.  No thoracic deformity.  No chest wall tenderness. 
Percussion, resonant.  Auscultation, clear.  No wheezes, no rales, no rhonchi.
CARDIOVASCULAR:  Precordial exam, nondisplaced.  No heaves or pericardial
thrills.  Rate and rhythm, regular.  Heart sounds, normal S1, normal S2.  No S3,
no gallop, no rub.  Systolic murmur, not heard.  Diastolic murmur, not heard.
EXTREMITIES:  No cyanosis, no edema.  Peripheral pulses, full and equal in all
extremities, except as noted.  No bruits appreciated.
ABDOMEN:  Soft, nondistended.  Normal aorta.  No bruit.  Nontender.  No masses. 
Liver, nontender, no hepatomegaly.  Spleen, nontender, no splenomegaly.
MUSCULOSKELETAL:  No joint tenderness.  No joint swelling.  No erythema.
NEUROLOGICAL:  Normal gait, normal strength, normal tone.
SKIN:  Warm and dry.
 
OVERALL IMPRESSION:  Non-Q-wave myocardial infarction.  At this time, his heart
rate is in 70 range and systolic blood pressures in the 110-120 range.  There is
little room to work with his continued medical management.  We will hold his
Coumadin at this point, start him on heparin and proceed with coronary
 
 
 
CONSULT REPORT                                 [id number]    DARRIN KENNEDY         
 
 
angiography in the a.m.
 
TRANSINT:[id number] Voice Confirmation ID: [id number] DOCUMENT ID: [id number]
                                           
                                           DALJIT HUNTLEY MD             
 
 
 
Electronically Signed by DALJIT HUNTLEY on 19 at 1651
 
 
 
 
 
 
 
 
 
 
 
 
 
 
 
 
 
 
 
 
 
 
 
 
 
 
 
 
 
 
 
 
 
 
 
 
 
 
CC:                                                             [id number]
DICTATION DATE: 19 1218     :     19 1227      DIS IN  
                                                                      19
[facility]                                          
[address] [place] [address]

## 2019-07-19 NOTE — EC
PATIENT:DARRIN KENNEDY             DATE OF SERVICE: 07/15/19
SEX: M                                  MEDICAL RECORD: [id number]
DATE OF BIRTH: [birth year]                        LOCATION:D.M2      D.212
AGE OF PATIENT: 58                             ADMISSION DATE: 07/15/19
 
REFERRING PHYSICIAN:                               
 
INTERPRETING PHYSICIAN: DALJIT OROSCO MD             
 
 
 
                             ECHOCARDIOGRAM REPORT
  ECHO CHARGES 5               ECHO LIMITED                  Date: 07/16/19
               1               DOPPLER ECHO COLOR FLOW  
               2               DOPPLER ECHO PULSE       
 
CLINICAL DIAGNOSIS: AVR                           
 
                         ECHOCARDIOGRAPHIC MEASUREMENTS
      (adult normal given)
   AC root (d.<3.7cm) 0    cm   LV Septum d (<1.2 cm> 0    cm
      Valve Excursion 0    cm     LV Septum (systole) 0    cm
Left Atria (s.<4.0cm> 0    cm          LVPW d(<1.2cm) 0    cm
        RV (d.<2.3cm) 0    cm           LVPW (sytole) 0    cm
  LV diastole(<5.6CM) 0    cm       MV E-F(>70mm/sec) 0    cm
           LV systole 0    cm           LVOT Diameter 1.7  cm
       MV exc.(>10mm) 0    cm
Est.ejection fraction (50-75%)     %
 
   DOPPLER:
     LVIT 0    cm/sec A 0    cm/sec E 0     cm/sec
       LA 0    cm/sec      RVSP 34.3 mmHg
     LVOT 76.0 cm/sec   AOP1/2T      m/s
  Asc. Ao 360.0cm/sec
     RVOT      cm/sec
       RA      cm/sec
       PA      cm/sec
 AV Gradient Peak 52.0 mmHg  AV Mean 23.0 mmHg  AV Area 0.5  cm
 MV Gradient Peak      mmHg  MV Mean      mmHg  MV Area      cm
   COMMENTS: *** LIMITED STUDY (2-D,COLOR,DOPPLER) ***    
             *** COMPLETE ECHO DONE ON 6/25/19 ***        
 
 Cardiac Sonographer: Hussein WEIR Stringer            
      Cardiologist: Hussein Orosco                
             TAPE# [email or web address]           
                                       Pericardial Effusion N                        
 
 
DATE OF SERVICE:  07/16/2019
 
FINDINGS:
1.  Left ventricular chamber size is within normal limits.  Left ventricular
systolic function is moderately reduced at 35% to 40%.
2.  Left atrium, right atrium, and right ventricular chamber sizes are mildly
dilated.
3.  Valvular structures:  Aortic valve has a mechanical prosthesis with normal
structure and function in its position.  No evidence of valvular thrombus.  The
remaining valvular structures have normal structure and motion.
 
 
 
ECHOCARDIOGRAM REPORT                          [id number]    KENNEDY,DARRIN RAY     
 
 
4.  Doppler interrogation elsewise reveals trace mitral regurgitation and
moderate tricuspid regurgitation.  No other valvular insufficiency or stenosis.
5.  No evidence of pericardial effusion or left ventricular thrombus.
6.  No cardiac source of neurologic emboli.
 
TRANSINT:[id number] Voice Confirmation ID: [id number] DOCUMENT ID: [id number]
                                           
                                           DALJIT OROSCO MD             
 
 
 
Electronically Signed by DALJIT OROSCO on 07/19/19 at 1651
 
 
 
 
 
 
 
 
 
 
 
 
 
 
 
 
 
 
 
 
 
 
 
 
 
 
 
 
 
 
 
 
 
 
CC:                                                             [id number]
DICTATION DATE: 07/17/19 1151     :     07/17/19 1157      DIS IN  
                                                                      07/18/19
[facility]                                          
[address] [place] [address]

## 2019-07-19 NOTE — OP
PATIENT NAME:  DARRIN KENNEDY                       MEDICAL RECORD: [id number]
:60                                             LOCATION:D.M2     D.2121
                                                         ADMISSION DATE:07/15/19
SURGEON:  DALJIT HUNTLEY MD             
 
 
DATE OF OPERATION:  2019
 
DATE OF SERVICE:  2019
 
PROCEDURES:
1.  PTCA stent LAD.
2.  IFR.
3.  Left heart catheterization.
4.  Selective coronary angiography.
 
PROCEDURE IN DETAIL:  After informed consent was obtained and after detailed
explanation risks, benefits as well as alternative therapies, the patient
elected to proceed with angiogram and angioplasty.  The right femoral area was
prepped and draped in normal sterile fashion.  Right femoral artery was
cannulated via modified Seldinger technique with placement of 6-Israeli sheath. 
All catheters exchanged through the sheath.
 
FINDINGS:  Left ventriculogram not performed secondary to artificial mechanical
aortic valve.
 
SELECTIVE CORONARY ANGIOGRAPHY:
1.  Left main is with no significant angiographic disease.
2.  Left anterior descending has previously placed stents, these are patent;
however, there is 80% stenosis after the distal stent.  IFR is abnormal in this
area.  There is a questionable proximal stenosis.  IFR is normal in this area. 
IFR was 0.83 in the distal LAD.
3.  Left circumflex has moderate irregularities, but no flow-limiting stenosis.
4.  Right coronary has moderate irregularities, but no flow limiting stenosis.
 
PTCA STENT OF THE LAD:  The stent used was 2.25 x 12 mm Anthony.  Result was 0%
residual stenosis.
 
OVERALL IMPRESSION:  Successful percutaneous transluminal coronary angioplasty
stent of the left anterior descending going from greater than 80% initial
stenosis to 0% residual.
 
TRANSINT:[id number] Voice Confirmation ID: [id number] DOCUMENT ID: [id number]
                                           
                                           DALJIT HUNTLEY MD             
 
 
 
Electronically Signed by DALJIT HUNTLEY on 19 at 1651
 
CC:                                                             [id number]
DICTATION DATE: 19 1054     :     19 1136      DIS IN  
                                                                      19
[place] [id number] [place] [phone]

## 2020-06-22 ENCOUNTER — HOSPITAL ENCOUNTER (EMERGENCY)
Dept: HOSPITAL 84 - D.ER | Age: 60
Discharge: HOME | End: 2020-06-22
Payer: COMMERCIAL

## 2020-06-22 VITALS
HEIGHT: 76 IN | BODY MASS INDEX: 22.58 KG/M2 | WEIGHT: 185.39 LBS | WEIGHT: 185.39 LBS | HEIGHT: 76 IN | BODY MASS INDEX: 22.58 KG/M2

## 2020-06-22 VITALS — DIASTOLIC BLOOD PRESSURE: 79 MMHG | SYSTOLIC BLOOD PRESSURE: 122 MMHG

## 2020-06-22 DIAGNOSIS — S63.501A: ICD-10-CM
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
## 2020-06-22 DIAGNOSIS — K21.9: ICD-10-CM

## 2020-06-22 DIAGNOSIS — Z72.0: ICD-10-CM
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
## 2020-06-22 DIAGNOSIS — I25.2: ICD-10-CM

## 2020-06-22 DIAGNOSIS — M25.551: Primary | ICD-10-CM

## 2020-07-08 ENCOUNTER — HOSPITAL ENCOUNTER (OUTPATIENT)
Dept: HOSPITAL 84 - D.US | Age: 60
Discharge: HOME | End: 2020-07-08
Attending: NURSE PRACTITIONER
Payer: COMMERCIAL

## 2020-07-08 VITALS — BODY MASS INDEX: 22.5 KG/M2

## 2020-07-08 DIAGNOSIS — D68.9: Primary | ICD-10-CM

## 2020-07-21 ENCOUNTER — HOSPITAL ENCOUNTER (EMERGENCY)
Dept: HOSPITAL 84 - D.ER | Age: 60
Discharge: HOME | End: 2020-07-21
Payer: COMMERCIAL

## 2020-07-21 VITALS — SYSTOLIC BLOOD PRESSURE: 134 MMHG | DIASTOLIC BLOOD PRESSURE: 76 MMHG

## 2020-07-21 VITALS
BODY MASS INDEX: 20.99 KG/M2 | HEIGHT: 76 IN | BODY MASS INDEX: 20.99 KG/M2 | HEIGHT: 76 IN | WEIGHT: 172.36 LBS | WEIGHT: 172.36 LBS

## 2020-07-21 DIAGNOSIS — I25.2: ICD-10-CM

## 2020-07-21 DIAGNOSIS — K21.9: ICD-10-CM

## 2020-07-21 DIAGNOSIS — R04.0: Primary | ICD-10-CM

## 2020-07-21 LAB
APTT BLD: 44.5 SECONDS (ref 22.8–39.4)
BASOPHILS NFR BLD AUTO: 0.3 % (ref 0–2)
EOSINOPHIL NFR BLD: 6 % (ref 0–7)
ERYTHROCYTE [DISTWIDTH] IN BLOOD BY AUTOMATED COUNT: 14.3 % (ref 11.5–14.5)
HCT VFR BLD CALC: 42.2 % (ref 42–54)
HGB BLD-MCNC: 13.7 G/DL (ref 13.5–17.5)
IMM GRANULOCYTES NFR BLD: 0.2 % (ref 0–5)
INR PPP: 2.78 (ref 0.85–1.17)
LYMPHOCYTES NFR BLD AUTO: 29.9 % (ref 15–50)
MCH RBC QN AUTO: 33.4 PG (ref 26–34)
MCHC RBC AUTO-ENTMCNC: 32.5 G/DL (ref 31–37)
MCV RBC: 102.9 FL (ref 80–100)
MONOCYTES NFR BLD: 10 % (ref 2–11)
NEUTROPHILS NFR BLD AUTO: 53.6 % (ref 40–80)
PLATELET # BLD: 177 10X3/UL (ref 130–400)
PMV BLD AUTO: 10.5 FL (ref 7.4–10.4)
PROTHROMBIN TIME: 28.8 SECONDS (ref 11.6–15)
RBC # BLD AUTO: 4.1 10X6/UL (ref 4.2–6.1)
WBC # BLD AUTO: 6.6 10X3/UL (ref 4.8–10.8)

## 2021-06-16 LAB
ANION GAP SERPL CALC-SCNC: 9.7 MMOL/L (ref 8–16)
APTT BLD: 40.1 SECONDS (ref 22.8–39.4)
BASOPHILS NFR BLD AUTO: 0.7 % (ref 0–2)
BUN SERPL-MCNC: 17 MG/DL (ref 7–18)
CALCIUM SERPL-MCNC: 8.9 MG/DL (ref 8.5–10.1)
CHLORIDE SERPL-SCNC: 105 MMOL/L (ref 98–107)
CO2 SERPL-SCNC: 31.1 MMOL/L (ref 21–32)
CREAT SERPL-MCNC: 0.9 MG/DL (ref 0.6–1.3)
EOSINOPHIL NFR BLD: 5 % (ref 0–7)
ERYTHROCYTE [DISTWIDTH] IN BLOOD BY AUTOMATED COUNT: 13.7 % (ref 11.5–14.5)
GLUCOSE SERPL-MCNC: 84 MG/DL (ref 74–106)
HCT VFR BLD CALC: 43.7 % (ref 42–54)
HGB BLD-MCNC: 14.5 G/DL (ref 13.5–17.5)
INR PPP: 1.28 (ref 0.85–1.17)
LYMPHOCYTES # BLD: 1.9 10X3/UL (ref 1.32–3.57)
LYMPHOCYTES NFR BLD AUTO: 32.6 % (ref 15–50)
MCH RBC QN AUTO: 33.9 PG (ref 26–34)
MCHC RBC AUTO-ENTMCNC: 33.1 G/DL (ref 31–37)
MCV RBC: 102.5 FL (ref 80–100)
MONOCYTES NFR BLD: 10.4 % (ref 2–11)
NEUTROPHILS # BLD: 2.9 10X3/UL (ref 1.78–5.38)
NEUTROPHILS NFR BLD AUTO: 51.3 % (ref 40–80)
OSMOLALITY SERPL CALC.SUM OF ELEC: 281 MOSM/KG (ref 275–300)
PLATELET # BLD: 168 10X3/UL (ref 130–400)
PMV BLD AUTO: 9 FL (ref 7.4–10.4)
POTASSIUM SERPL-SCNC: 4.8 MMOL/L (ref 3.5–5.1)
PROTHROMBIN TIME: 14.8 SECONDS (ref 11.6–15)
RBC # BLD AUTO: 4.26 10X6/UL (ref 4.2–6.1)
SODIUM SERPL-SCNC: 141 MMOL/L (ref 136–145)
WBC # BLD AUTO: 5.7 10X3/UL (ref 4.8–10.8)

## 2021-06-17 ENCOUNTER — HOSPITAL ENCOUNTER (OUTPATIENT)
Dept: HOSPITAL 84 - D.OPS | Age: 61
Discharge: HOME | End: 2021-06-17
Attending: ORTHOPAEDIC SURGERY
Payer: COMMERCIAL

## 2021-06-17 VITALS — DIASTOLIC BLOOD PRESSURE: 68 MMHG | SYSTOLIC BLOOD PRESSURE: 127 MMHG

## 2021-06-17 VITALS — HEIGHT: 75 IN | BODY MASS INDEX: 21.76 KG/M2 | BODY MASS INDEX: 21.76 KG/M2 | WEIGHT: 175 LBS

## 2021-06-17 DIAGNOSIS — Z79.01: ICD-10-CM

## 2021-06-17 DIAGNOSIS — M25.531: ICD-10-CM

## 2021-06-17 DIAGNOSIS — M79.9: ICD-10-CM

## 2021-06-17 DIAGNOSIS — M67.431: Primary | ICD-10-CM

## 2021-06-17 NOTE — NUR
DISCHARGED VIA W/C, ACCOMPANIED BY THIS NURSE, TO POV WITH SPOUSE
DRIVING. ALL BELONGINGS WITH SPOUSE. REFILLED ICE PACK FOR PT PRIOR
TO DISCHARGE.

## 2021-06-17 NOTE — OP
PATIENT NAME:  DARRIN KENNEDY                       MEDICAL RECORD: [id number]
:60                                             LOCATION:[place]          
                                                         ADMISSION DATE:        
SURGEON:  ANTWON SEALS MD           
 
 
DATE OF OPERATION:  2021
 
PREOPERATIVE DIAGNOSIS:  Ganglion cyst, right wrist.
 
POSTOPERATIVE DIAGNOSIS:  Ganglion cyst, right wrist.
 
PROCEDURE PERFORMED:  Excision of ganglion cyst, right wrist.
 
INDICATIONS FOR THE PROCEDURE:  Mr. Kennedy is a 60-year-old male with a
history of a soft tissue mass to the dorsal ulnar aspect of the right wrist. 
The mass is becoming more symptomatic and gradually becoming larger.  MRI showed
evidence of fluid-filled mass, likely consistent with a ganglion cyst.  I talked
with him about options for conservative versus surgical treatment and he elected
to proceed with surgery for surgical excision.  Risks, benefits and alternatives
of surgery were discussed with the patient and consent was obtained.
 
DESCRIPTION OF THE PROCEDURE:  The patient was met in the holding area where his
identity and confirmation of procedure was performed.  The right upper extremity
was marked.  He was taken to the operating room where he was placed supine on
the operating table and anesthesia was administered.  Tourniquet was applied to
the right arm and the right arm was prepped and draped in a sterile fashion. 
The patient received preoperative antibiotics and timeout was performed prior to
initiating the case.  On initiation of the case, the arm was exsanguinated and
the tourniquet was raised.  Total tourniquet time was 20 minutes.  A
longitudinal incision was made over the mass, dissecting down through the skin
and subcutaneous tissues.  We then encountered the capsule of the cyst,
continued our dissection circumferentially to isolate the capsule.  The capsule
was then incised and a large amount of clear jelly-like fluid was aspirated. 
The capsule was then transected at its base originating near the medial edge of
the ulnar styloid.  The wound was irrigated thoroughly with saline.  Cautery was
used to control any bleeding and a 3-0 Vicryl was then used to close the soft
tissues around the edge of the mass excision.  Soft tissues were infiltrated
with 0.25% Marcaine with epinephrine.  The subcutaneous skin was closed with
Vicryl suture and the skin was closed with nylon.  A sterile dressing was
placed.  The patient was placed into a volar splint, turned back over to
anesthesia where he was awakened and taken to the recovery room in stable
condition.
 
POSTOPERATIVE PLAN:  The patient is going to return home with his family.  He
needs to keep the incision clean and dry, remain in splint until followup.  We
will see him back in clinic in 2 weeks.
 
COMPLICATIONS:  None.
 
ESTIMATED BLOOD LOSS:  5 mL.
 
ANESTHESIA:  General.
 
TRANSINT:[id number] Voice Confirmation ID: [id number] DOCUMENT ID: [id number]
                                           
 
 
 
OPERATIVE REPORT                               [id number]    DARRIN KENNEDY BRENT M MD           
 
 
 
 
 
 
 
 
 
 
 
 
 
 
 
 
 
 
 
 
 
 
 
 
 
 
 
 
 
 
 
 
 
 
 
 
 
 
 
 
 
 
 
 
 
 
CC:                                                             [id number]
DICTATION DATE: 21     :     21 09      [place] [id number] [place] [phone]

## 2021-06-17 NOTE — NUR
PT WILL RESTART LOVENOX INJECTIONS AND ORAL WARFARIN TODAY. PT AND
SPOUSE BOTH STATE HE HAS 2 DOSES OF LOVENOX FOR TODAY AND 2 FOR
TOMORROW, RX PROVIDED BY DR SEALS'S OFFICE.